# Patient Record
Sex: MALE | Race: WHITE | NOT HISPANIC OR LATINO | Employment: FULL TIME | URBAN - METROPOLITAN AREA
[De-identification: names, ages, dates, MRNs, and addresses within clinical notes are randomized per-mention and may not be internally consistent; named-entity substitution may affect disease eponyms.]

---

## 2017-08-30 ENCOUNTER — HOSPITAL ENCOUNTER (EMERGENCY)
Facility: HOSPITAL | Age: 37
Discharge: HOME | End: 2017-08-31
Attending: EMERGENCY MEDICINE | Admitting: EMERGENCY MEDICINE

## 2017-08-30 DIAGNOSIS — K40.90 RIGHT INGUINAL HERNIA: Primary | ICD-10-CM

## 2017-08-30 LAB
ALBUMIN SERPL-MCNC: 4.6 G/DL (ref 3.2–4.8)
ALBUMIN/GLOB SERPL: 1.7 G/DL (ref 1.5–2.5)
ALP SERPL-CCNC: 54 U/L (ref 25–100)
ALT SERPL W P-5'-P-CCNC: 22 U/L (ref 7–40)
ANION GAP SERPL CALCULATED.3IONS-SCNC: 3 MMOL/L (ref 3–11)
AST SERPL-CCNC: 21 U/L (ref 0–33)
BASOPHILS # BLD AUTO: 0.01 10*3/MM3 (ref 0–0.2)
BASOPHILS NFR BLD AUTO: 0.2 % (ref 0–1)
BILIRUB SERPL-MCNC: 0.4 MG/DL (ref 0.3–1.2)
BILIRUB UR QL STRIP: NEGATIVE
BUN BLD-MCNC: 13 MG/DL (ref 9–23)
BUN/CREAT SERPL: 16.3 (ref 7–25)
CALCIUM SPEC-SCNC: 9.5 MG/DL (ref 8.7–10.4)
CHLORIDE SERPL-SCNC: 107 MMOL/L (ref 99–109)
CLARITY UR: CLEAR
CO2 SERPL-SCNC: 31 MMOL/L (ref 20–31)
COLOR UR: YELLOW
CREAT BLD-MCNC: 0.8 MG/DL (ref 0.6–1.3)
DEPRECATED RDW RBC AUTO: 38.9 FL (ref 37–54)
EOSINOPHIL # BLD AUTO: 0.17 10*3/MM3 (ref 0–0.3)
EOSINOPHIL NFR BLD AUTO: 3 % (ref 0–3)
ERYTHROCYTE [DISTWIDTH] IN BLOOD BY AUTOMATED COUNT: 13.3 % (ref 11.3–14.5)
GFR SERPL CREATININE-BSD FRML MDRD: 109 ML/MIN/1.73
GLOBULIN UR ELPH-MCNC: 2.7 GM/DL
GLUCOSE BLD-MCNC: 112 MG/DL (ref 70–100)
GLUCOSE UR STRIP-MCNC: NEGATIVE MG/DL
HCT VFR BLD AUTO: 40.1 % (ref 38.9–50.9)
HGB BLD-MCNC: 12.9 G/DL (ref 13.1–17.5)
HGB UR QL STRIP.AUTO: NEGATIVE
IMM GRANULOCYTES # BLD: 0.01 10*3/MM3 (ref 0–0.03)
IMM GRANULOCYTES NFR BLD: 0.2 % (ref 0–0.6)
KETONES UR QL STRIP: NEGATIVE
LEUKOCYTE ESTERASE UR QL STRIP.AUTO: NEGATIVE
LIPASE SERPL-CCNC: 38 U/L (ref 6–51)
LYMPHOCYTES # BLD AUTO: 2.34 10*3/MM3 (ref 0.6–4.8)
LYMPHOCYTES NFR BLD AUTO: 41 % (ref 24–44)
MCH RBC QN AUTO: 26 PG (ref 27–31)
MCHC RBC AUTO-ENTMCNC: 32.2 G/DL (ref 32–36)
MCV RBC AUTO: 80.8 FL (ref 80–99)
MONOCYTES # BLD AUTO: 0.39 10*3/MM3 (ref 0–1)
MONOCYTES NFR BLD AUTO: 6.8 % (ref 0–12)
NEUTROPHILS # BLD AUTO: 2.79 10*3/MM3 (ref 1.5–8.3)
NEUTROPHILS NFR BLD AUTO: 48.8 % (ref 41–71)
NITRITE UR QL STRIP: NEGATIVE
PH UR STRIP.AUTO: 7 [PH] (ref 5–8)
PLATELET # BLD AUTO: 198 10*3/MM3 (ref 150–450)
PMV BLD AUTO: 11.1 FL (ref 6–12)
POTASSIUM BLD-SCNC: 3.8 MMOL/L (ref 3.5–5.5)
PROT SERPL-MCNC: 7.3 G/DL (ref 5.7–8.2)
PROT UR QL STRIP: NEGATIVE
RBC # BLD AUTO: 4.96 10*6/MM3 (ref 4.2–5.76)
SODIUM BLD-SCNC: 141 MMOL/L (ref 132–146)
SP GR UR STRIP: 1.01 (ref 1–1.03)
UROBILINOGEN UR QL STRIP: NORMAL
WBC NRBC COR # BLD: 5.71 10*3/MM3 (ref 3.5–10.8)

## 2017-08-30 PROCEDURE — 85025 COMPLETE CBC W/AUTO DIFF WBC: CPT | Performed by: EMERGENCY MEDICINE

## 2017-08-30 PROCEDURE — 99284 EMERGENCY DEPT VISIT MOD MDM: CPT

## 2017-08-30 PROCEDURE — 83690 ASSAY OF LIPASE: CPT | Performed by: EMERGENCY MEDICINE

## 2017-08-30 PROCEDURE — 80053 COMPREHEN METABOLIC PANEL: CPT | Performed by: EMERGENCY MEDICINE

## 2017-08-30 PROCEDURE — 81003 URINALYSIS AUTO W/O SCOPE: CPT | Performed by: EMERGENCY MEDICINE

## 2017-08-30 RX ORDER — SODIUM CHLORIDE 0.9 % (FLUSH) 0.9 %
10 SYRINGE (ML) INJECTION AS NEEDED
Status: DISCONTINUED | OUTPATIENT
Start: 2017-08-30 | End: 2017-08-31 | Stop reason: HOSPADM

## 2017-08-31 ENCOUNTER — APPOINTMENT (OUTPATIENT)
Dept: CT IMAGING | Facility: HOSPITAL | Age: 37
End: 2017-08-31

## 2017-08-31 VITALS
TEMPERATURE: 98.2 F | OXYGEN SATURATION: 100 % | BODY MASS INDEX: 28.7 KG/M2 | SYSTOLIC BLOOD PRESSURE: 140 MMHG | DIASTOLIC BLOOD PRESSURE: 98 MMHG | HEART RATE: 68 BPM | HEIGHT: 71 IN | RESPIRATION RATE: 20 BRPM | WEIGHT: 205 LBS

## 2017-08-31 LAB
HOLD SPECIMEN: NORMAL
HOLD SPECIMEN: NORMAL
WHOLE BLOOD HOLD SPECIMEN: NORMAL
WHOLE BLOOD HOLD SPECIMEN: NORMAL

## 2017-08-31 PROCEDURE — 0 IOPAMIDOL 61 % SOLUTION: Performed by: EMERGENCY MEDICINE

## 2017-08-31 PROCEDURE — 96374 THER/PROPH/DIAG INJ IV PUSH: CPT

## 2017-08-31 PROCEDURE — 96375 TX/PRO/DX INJ NEW DRUG ADDON: CPT

## 2017-08-31 PROCEDURE — 25010000002 HYDROMORPHONE PER 4 MG: Performed by: EMERGENCY MEDICINE

## 2017-08-31 PROCEDURE — 25010000002 KETOROLAC TROMETHAMINE PER 15 MG: Performed by: PHYSICIAN ASSISTANT

## 2017-08-31 PROCEDURE — 96376 TX/PRO/DX INJ SAME DRUG ADON: CPT

## 2017-08-31 PROCEDURE — 0 DIATRIZOATE MEGLUMINE & SODIUM PER 1 ML: Performed by: EMERGENCY MEDICINE

## 2017-08-31 PROCEDURE — 74177 CT ABD & PELVIS W/CONTRAST: CPT

## 2017-08-31 PROCEDURE — 96361 HYDRATE IV INFUSION ADD-ON: CPT

## 2017-08-31 PROCEDURE — 25010000002 ONDANSETRON PER 1 MG: Performed by: EMERGENCY MEDICINE

## 2017-08-31 RX ORDER — KETOROLAC TROMETHAMINE 15 MG/ML
15 INJECTION, SOLUTION INTRAMUSCULAR; INTRAVENOUS ONCE
Status: COMPLETED | OUTPATIENT
Start: 2017-08-31 | End: 2017-08-31

## 2017-08-31 RX ORDER — HYDROMORPHONE HYDROCHLORIDE 1 MG/ML
0.5 INJECTION, SOLUTION INTRAMUSCULAR; INTRAVENOUS; SUBCUTANEOUS ONCE
Status: COMPLETED | OUTPATIENT
Start: 2017-08-31 | End: 2017-08-31

## 2017-08-31 RX ORDER — SODIUM CHLORIDE 9 MG/ML
125 INJECTION, SOLUTION INTRAVENOUS CONTINUOUS
Status: DISCONTINUED | OUTPATIENT
Start: 2017-08-31 | End: 2017-08-31 | Stop reason: HOSPADM

## 2017-08-31 RX ORDER — OXYCODONE AND ACETAMINOPHEN 7.5; 325 MG/1; MG/1
1 TABLET ORAL EVERY 6 HOURS PRN
Qty: 12 TABLET | Refills: 0 | Status: SHIPPED | OUTPATIENT
Start: 2017-08-31 | End: 2021-10-15

## 2017-08-31 RX ORDER — ONDANSETRON 2 MG/ML
4 INJECTION INTRAMUSCULAR; INTRAVENOUS ONCE
Status: COMPLETED | OUTPATIENT
Start: 2017-08-31 | End: 2017-08-31

## 2017-08-31 RX ORDER — ONDANSETRON 4 MG/1
4 TABLET, FILM COATED ORAL EVERY 8 HOURS PRN
Qty: 20 TABLET | Refills: 0 | Status: SHIPPED | OUTPATIENT
Start: 2017-08-31 | End: 2021-10-15

## 2017-08-31 RX ADMIN — IOPAMIDOL 100 ML: 612 INJECTION, SOLUTION INTRAVENOUS at 01:04

## 2017-08-31 RX ADMIN — HYDROMORPHONE HYDROCHLORIDE 0.5 MG: 1 INJECTION, SOLUTION INTRAMUSCULAR; INTRAVENOUS; SUBCUTANEOUS at 03:02

## 2017-08-31 RX ADMIN — DIATRIZOATE MEGLUMINE AND DIATRIZOATE SODIUM 15 ML: 660; 100 LIQUID ORAL; RECTAL at 00:00

## 2017-08-31 RX ADMIN — HYDROMORPHONE HYDROCHLORIDE 1 MG: 1 INJECTION, SOLUTION INTRAMUSCULAR; INTRAVENOUS; SUBCUTANEOUS at 00:57

## 2017-08-31 RX ADMIN — ONDANSETRON 4 MG: 2 INJECTION INTRAMUSCULAR; INTRAVENOUS at 00:56

## 2017-08-31 RX ADMIN — HYDROMORPHONE HYDROCHLORIDE 0.5 MG: 1 INJECTION, SOLUTION INTRAMUSCULAR; INTRAVENOUS; SUBCUTANEOUS at 01:44

## 2017-08-31 RX ADMIN — SODIUM CHLORIDE 125 ML/HR: 9 INJECTION, SOLUTION INTRAVENOUS at 01:27

## 2017-08-31 RX ADMIN — KETOROLAC TROMETHAMINE 15 MG: 15 INJECTION, SOLUTION INTRAMUSCULAR; INTRAVENOUS at 01:48

## 2019-06-12 ENCOUNTER — HOSPITAL ENCOUNTER (OUTPATIENT)
Age: 39
End: 2019-06-12
Payer: MEDICAID

## 2019-06-12 DIAGNOSIS — D64.9: Primary | ICD-10-CM

## 2019-06-12 LAB
FERRITIN SERPL-MCNC: 6 NG/ML (ref 8–388)
HCT VFR BLD CALC: 36.5 % (ref 42–52)
HGB BLD-MCNC: 11 G/DL (ref 14.1–18)
MCHC RBC-ENTMCNC: 30 G/DL (ref 31.8–35.4)
MCV RBC: 70.8 FL (ref 80–94)
MEAN CORPUSCULAR HEMOGLOBIN: 21.3 PG (ref 27–31.2)
PLATELET # BLD: 182 K/MM3 (ref 142–424)
RBC # BLD AUTO: 5.16 M/MM3 (ref 4.6–6.2)
WBC # BLD AUTO: 3.8 K/MM3 (ref 4.8–10.8)

## 2019-06-12 PROCEDURE — 82728 ASSAY OF FERRITIN: CPT

## 2019-06-12 PROCEDURE — 83540 ASSAY OF IRON: CPT

## 2019-06-12 PROCEDURE — 83550 IRON BINDING TEST: CPT

## 2019-06-12 PROCEDURE — 36415 COLL VENOUS BLD VENIPUNCTURE: CPT

## 2019-06-12 PROCEDURE — 82607 VITAMIN B-12: CPT

## 2019-06-12 PROCEDURE — 85025 COMPLETE CBC W/AUTO DIFF WBC: CPT

## 2019-06-13 ENCOUNTER — HOSPITAL ENCOUNTER (OUTPATIENT)
Age: 39
End: 2019-06-13
Payer: MEDICAID

## 2019-06-13 DIAGNOSIS — R10.84: ICD-10-CM

## 2019-06-13 DIAGNOSIS — D64.9: ICD-10-CM

## 2019-06-13 DIAGNOSIS — R19.7: Primary | ICD-10-CM

## 2019-06-13 DIAGNOSIS — K62.5: ICD-10-CM

## 2019-06-13 DIAGNOSIS — K21.9: ICD-10-CM

## 2019-06-13 LAB
IRON: 43 UG/DL (ref 38–169)
UIBC: 442 UG/DL (ref 111–343)

## 2019-06-13 PROCEDURE — 87506 IADNA-DNA/RNA PROBE TQ 6-11: CPT

## 2019-06-14 LAB — VITAMIN B12: 510 PG/ML (ref 232–1245)

## 2019-07-03 ENCOUNTER — HOSPITAL ENCOUNTER (OUTPATIENT)
Age: 39
End: 2019-07-03
Payer: MEDICAID

## 2019-07-03 DIAGNOSIS — K82.9: ICD-10-CM

## 2019-07-03 DIAGNOSIS — R19.7: ICD-10-CM

## 2019-07-03 DIAGNOSIS — K85.90: ICD-10-CM

## 2019-07-03 DIAGNOSIS — D12.6: ICD-10-CM

## 2019-07-03 DIAGNOSIS — R10.11: Primary | ICD-10-CM

## 2019-07-03 LAB
ALBUMIN LEVEL: 4.2 GM/DL (ref 3.4–5)
ALBUMIN/GLOB SERPL: 1.2 {RATIO} (ref 1.1–1.8)
ALP ISO SERPL-ACNC: 68 U/L (ref 46–116)
ALT SERPLBLD-CCNC: 30 U/L (ref 12–78)
AMYLASE SERPL-CCNC: 19 U/L (ref 25–115)
ANION GAP SERPL CALC-SCNC: 10.5 MEQ/L (ref 5–15)
AST SERPL QL: 18 U/L (ref 15–37)
BILIRUBIN,TOTAL: 0.5 MG/DL (ref 0.2–1)
BUN SERPL-MCNC: 19 MG/DL (ref 7–18)
CALCIUM SPEC-MCNC: 8.7 MG/DL (ref 8.5–10.1)
CHLORIDE SPEC-SCNC: 105 MMOL/L (ref 98–107)
CO2 SERPL-SCNC: 29 MMOL/L (ref 21–32)
CREAT BLD-SCNC: 0.88 MG/DL (ref 0.7–1.3)
ESTIMATED GLOMERULAR FILT RATE: 97 ML/MIN (ref 60–?)
GFR (AFRICAN AMERICAN): 117 ML/MIN (ref 60–?)
GLOBULIN SER CALC-MCNC: 3.5 GM/DL (ref 1.3–3.2)
GLUCOSE: 102 MG/DL (ref 74–106)
HCT VFR BLD CALC: 37.1 % (ref 42–52)
HGB BLD-MCNC: 11.4 G/DL (ref 14.1–18)
LIPASE: 92 U/L (ref 73–393)
MCHC RBC-ENTMCNC: 30.7 G/DL (ref 31.8–35.4)
MCV RBC: 72 FL (ref 80–94)
MEAN CORPUSCULAR HEMOGLOBIN: 22.1 PG (ref 27–31.2)
PLATELET # BLD: 237 K/MM3 (ref 142–424)
POTASSIUM: 4.5 MMOL/L (ref 3.5–5.1)
PROT SERPL-MCNC: 7.7 GM/DL (ref 6.4–8.2)
RBC # BLD AUTO: 5.16 M/MM3 (ref 4.6–6.2)
SODIUM SPEC-SCNC: 140 MMOL/L (ref 136–145)
WBC # BLD AUTO: 4.7 K/MM3 (ref 4.8–10.8)

## 2019-07-03 PROCEDURE — 82150 ASSAY OF AMYLASE: CPT

## 2019-07-03 PROCEDURE — 85025 COMPLETE CBC W/AUTO DIFF WBC: CPT

## 2019-07-03 PROCEDURE — 80053 COMPREHEN METABOLIC PANEL: CPT

## 2019-07-03 PROCEDURE — 83690 ASSAY OF LIPASE: CPT

## 2019-07-03 PROCEDURE — 36415 COLL VENOUS BLD VENIPUNCTURE: CPT

## 2019-07-08 ENCOUNTER — TRANSCRIBE ORDERS (OUTPATIENT)
Dept: PHYSICAL THERAPY | Facility: HOSPITAL | Age: 39
End: 2019-07-08

## 2019-07-08 ENCOUNTER — HOSPITAL ENCOUNTER (OUTPATIENT)
Age: 39
End: 2019-07-08
Payer: MEDICAID

## 2019-07-08 DIAGNOSIS — M54.12 CERVICAL RADICULOPATHY AT C7: ICD-10-CM

## 2019-07-08 DIAGNOSIS — K82.9: Primary | ICD-10-CM

## 2019-07-08 DIAGNOSIS — M54.12 CERVICAL RADICULOPATHY AT C6: Primary | ICD-10-CM

## 2019-07-08 PROCEDURE — 76705 ECHO EXAM OF ABDOMEN: CPT

## 2019-07-18 ENCOUNTER — HOSPITAL ENCOUNTER (OUTPATIENT)
Dept: PHYSICAL THERAPY | Facility: HOSPITAL | Age: 39
Setting detail: THERAPIES SERIES
Discharge: HOME | End: 2019-07-18

## 2019-07-18 DIAGNOSIS — M54.2 NECK PAIN: Primary | ICD-10-CM

## 2019-07-18 PROCEDURE — 97161 PT EVAL LOW COMPLEX 20 MIN: CPT

## 2019-07-18 NOTE — THERAPY EVALUATION
Outpatient Physical Therapy Ortho Initial Evaluation  [facility]     Patient Name: Robbi Pace  : 1980  MRN: [id number]  Today's Date: 2019      Visit Date: 2019    There is no problem list on file for this patient.       No past medical history on file.     No past surgical history on file.    Visit Dx:     ICD-10-CM ICD-9-CM   1. Neck pain M54.2 723.1         Patient History     Row Name 19 1400             History    Chief Complaint  Pain;Numbness  -THAI (r) NS (t) THAI (c)      Type of Pain  Upper Extremity / Arm;Hand pain  -THAI (r) NS (t) THAI (c)      Brief Description of Current Complaint  Patient states that he has had neck issues off and on for 12 years. He reports numbness in his left arm and fingers, mainly his digits 1-3. This episode has been going on for about 1 month. He is a  and has noticed increased tingling and  weakness at work. He also feels increased tingling with his head extended. He has 4 children and notes that the tingling sometimes comes on when he is playing with them, although he is not limited in his activities.  -THAI (r) NS (t) THAI (c)      Smoking Status  nonuser  -THAI (r) NS (t) THAI (c)      Hand Dominance  right-handed  -THAI (r) NS (t) THAI (c)      Occupation/sports/leisure activities  spending time with his family, works as a   -THAI (r) NS (t) THAI (c)      Patient seeing anyone else for problem(s)?  has had PT for his neck in the past  -THAI (r) NS (t) THAI (c)         Pain     Pain at Present  0  -THAI (r) NS (t) THAI (c)      Pain at Best  0  -THAI (r) NS (t) THAI (c)      Pain at Worst  0  -THAI (r) NS (t) THAI (c)      Difficulties at work?   weakness and tingling  -THAI (r) NS (t) THAI (c)      Difficulties with ADL's?  No  -THAI (r) NS (t) THAI (c)      Difficulties with recreational activities?  sitting in certain positions  -THAI (r) NS (t) THAI (c)         Fall Risk Assessment    Any falls in the past year:  No  -THAI (r) NS (t) THAI (c)          Daily Activities    Primary Language  English  -THAI (r) NS (t) THAI (c)      Barriers to learning  None  -THAI (r) NS (t) THAI (c)         Safety    Are you being hurt, hit, or frightened by anyone at home or in your life?  No  -THAI (r) NS (t) THAI (c)        User Key  (r) = Recorded By, (t) = Taken By, (c) = Cosigned By    Initials Name Provider Type    THAI Olvin Chaney, PT Physical Therapist    NS Divine Ng, PT Student PT Student          PT Ortho     Row Name 07/18/19 1400       Posture/Observations    Posture/Observations Comments  Forward head posture, rounded shoulders  -THAI (r) NS (t) THAI (c)       DTR- Upper Quarter Clearing    Biceps (C5/6)  Bilateral:;2- Normal response  -THAI (r) NS (t) THAI (c)    Brachioradialis (C6)  Bilateral:;2- Normal response  -THAI (r) NS (t) THAI (c)    Triceps (C7)  Bilateral:;2- Normal response  -THAI (r) NS (t) THAI (c)       Neural Tension Signs- Upper Quarter Clearing    ULNTT 1  Bilateral:;Postive  -THAI (r) NS (t) THAI (c)    ULNTT 3  Left:;Negative  -THAI (r) NS (t) THAI (c)    ULNTT 4  Left:;Negative  -THAI (r) NS (t) THAI (c)       Myotomal Screen- Upper Quarter Clearing    Shoulder flexion (C5)  Left:;4- (Good -);Right:;4+ (Good +)  -THAI (r) NS (t) THAI (c)    Elbow flexion/wrist extension (C6)  Bilateral:;5 (Normal)  -THAI (r) NS (t) THAI (c)    Elbow extension/wrist flexion (C7)  Bilateral:;4+ (Good +)  -THAI (r) NS (t) THAI (c)       Cervical/Shoulder ROM Screen    Cervical flexion  Normal Increased crepitus in lower c-spine  -THAI (r) NS (t) THAI (c)    Cervical extension  Normal  -THAI (r) NS (t) THAI (c)    Cervical quadrant (Spurling's)  Normal  -THAI (r) NS (t) THAI (c)    Shoulder elevation   Normal  -THAI (r) NS (t) THAI (c)       Cervical Palpation    Cervical Palpation- Location?  -- Increase UE sx during palpation of C3-C7 spinous processes  -THAI (r) NS (t) THAI (c)       Cervical/Thoracic Special Tests    Spurlings (Foraminal Compression)  Bilateral:;Negative  -THAI (r) NS (t) THAI (c)    Cervical Distraction  (Foraminal Compression vs. Facet Pain)  Bilateral:;Positive relief of sx following palpation  -THAI (r) NS (t) THAI (c)    Tinel's Sign (TOS)  Left:;Negative  -THAI (r) NS (t) THAI (c)    Phalen's Test (Carpal Tunnel Syndrome)  Left:;Positive  -THAI (r) NS (t) THAI (c)       General ROM    GENERAL ROM COMMENTS  WFL except R rotation: 50 with pain, L rotation: 56  -THAI (r) NS (t) THAI (c)       MMT (Manual Muscle Testing)    General MMT Comments  Shoulder ABD: L 4-/5 with increased pain/tingling in L cervical/trap region, R 4+/5. R scap retraction: 3+/5, L: unable to complete due to pain  -THAI (r) NS (t) THAI (c)        Strength Right    Right  Test 1  105  -THAI (r) NS (t) THAI (c)    Right  Test 2  102  -THAI (r) NS (t) THAI (c)    Right  Test 3  100  -THAI (r) NS (t) THAI (c)     Strength Average Right  102.33  -THAI (r) NS (t)        Strength Left    Left  Test 1  80  -THAI (r) NS (t) THAI (c)    Left  Test 2  75  -THAI (r) NS (t) THAI (c)    Left  Test 3  73  -THAI (r) NS (t) THAI (c)     Strength Average Left  76  -THAI (r) NS (t)       Sensation    Light Touch  Partial deficits in the LUE  -THAI (r) NS (t) THAI (c)       Hand  Strength     Strength Affected Side  Left  -THAI (r) NS (t) THAI (c)      User Key  (r) = Recorded By, (t) = Taken By, (c) = Cosigned By    Initials Name Provider Type    Olvin Najera, PT Physical Therapist    Divine Anderson, PT Student PT Student                      Therapy Education  Education Details: Patient was educated about PT POC and completion of HEP  Given: HEP  Program: New  How Provided: Verbal, Demonstration, Written  Provided to: Patient  Level of Understanding: Verbalized, Demonstrated     PT OP Goals     Row Name 07/18/19 1400          PT Short Term Goals    STG Date to Achieve  08/08/19  -THAI (r) NS (t) THAI (c)     STG 1  Patient will be compliant with initial HEP  -THAI (r) NS (t) THAI (c)     STG 1 Progress  New  -THAI (r) NS (t) THAI (c)     STG 2  Patient will report no  tingling/numbness in arm/hand for more than 1/2 the day  -THAI (r) NS (t) THAI (c)     STG 2 Progress  New  -THAI (r) NS (t) THAI (c)        Long Term Goals    LTG Date to Achieve  08/29/19  -THAI (r) NS (t) THAI (c)     LTG 1  Patient will demonstrate independence with final HEP  -THAI (r) NS (t) THAI (c)     LTG 1 Progress  New  -THAI (r) NS (t) THAI (c)     LTG 2  Patient will demonstrate L UE strength >/= 4+/5 without pain  -THAI (r) NS (t) THAI (c)     LTG 2 Progress  New  -THAI (r) NS (t) THAI (c)     LTG 3  Patient will demonstrate improved L  strength to 85lbs to improve work tasks  -THAI (r) NS (t) THAI (c)     LTG 3 Progress  New  -THAI (r) NS (t) THAI (c)     LTG 4  Patient will demonstrate cervical rotation WFL without pain  -THAI (r) NS (t) THAI (c)     LTG 4 Progress  New  -THAI (r) NS (t) THAI (c)        Time Calculation    PT Goal Re-Cert Due Date  10/16/19  -THAI (r) NS (t) THAI (c)       User Key  (r) = Recorded By, (t) = Taken By, (c) = Cosigned By    Initials Name Provider Type    Olvin Najera, PT Physical Therapist    Divine Anderson, PT Student PT Student          PT Assessment/Plan     Row Name 07/18/19 1400          PT Assessment    Functional Limitations  Limitation in home management;Limitations in community activities;Performance in leisure activities;Performance in work activities  -THAI (r) NS (t) THAI (c)     Impairments  Impaired flexibility;Muscle strength;Pain;Poor body mechanics;Posture;Range of motion;Sensation  -THAI (r) NS (t) THAI (c)     Assessment Comments  Patient is a 39 year old male who presents with numbness and tingling in his left hand and digits 1-3, as well as around his elbow. His symptoms increase with prolonged cervical extension, right rotation, and upon palpation of most of the c-spine. He has strength and ROM deficits both in the cervical spine and L shoulder. His symptoms improve with distraction. He is demonstrating cervical radiculopathy, likely at C6-C7, as well as a potential carpal tunnel  involvement. He is limited at work and at home due to these deficits, therefore, he will benefit from skilled PT to address these deficits and pain to improve his overall function.   -THAI (r) NS (t) THAI (cindy)     Please refer to paper survey for additional self-reported information  Yes  -THAI (r) NS (t) THAI (c)     Rehab Potential  Good  -THAI (r) NS (t) THAI (cindy)     Patient/caregiver participated in establishment of treatment plan and goals  Yes  -THAI (r) NS (t) THAI (c)     Patient would benefit from skilled therapy intervention  Yes  -THAI (r) NS (t) THAI (c)        PT Plan    PT Frequency  2x/week;1x/week  -THAI (r) NS (t) THAI (cindy)     Predicted Duration of Therapy Intervention (Therapy Eval)  10 visits  -THAI (r) NS (t) THAI (cindy)     Planned CPT's?  PT EVAL LOW COMPLEXITY: 82725;PT THER PROC EA 15 MIN: 68985;PT THER ACT EA 15 MIN: 84644;PT MANUAL THERAPY EA 15 MIN: 75782;PT NEUROMUSC RE-EDUCATION EA 15 MIN: 69898;PT ELECTRICAL STIM UNATTEND: ;PT ULTRASOUND EA 15 MIN: 69197;PT TRACTION CERVICAL: 82730;PT HOT/COLD PACK WC NONMCARE: 02573;PT IONTOPHORESIS EA 15 MIN: 75710  -THAI (r) NS (t) THAI (c)     PT Plan Comments  Patient will be seen 1-2x/week with interventions to include posture training, cervical distraction, scapular stabilization, and manual therapy/modalities as indicated to improve function.  -THAI (r) NS (t) THAI (c)       User Key  (r) = Recorded By, (t) = Taken By, (c) = Cosigned By    Initials Name Provider Type    Olvin Najera, PT Physical Therapist    Divine Anderson, PT Student PT Student            Exercises     Row Name 07/18/19 1400             Exercise 1    Exercise Name 1  Scap retractions  -THAI (r) NS (t) THAI (c)      Reps 1  5  -THAI (r) NS (t) THAI (c)      Time 1  3 sec hold  -THAI (r) NS (t) THAI (c)         Exercise 2    Exercise Name 2  Chin retractions  -THAI (r) NS (t) THAI (c)      Reps 2  5  -THAI (r) NS (t) THAI (c)      Time 2  3 sec hold  -THAI (r) NS (t) THAI (c)         Exercise 3    Exercise Name 3  CT1 stretch   -THAI (r) NS (t) THAI (c)      Reps 3  1  -THAI (r) NS (t) THAI (c)      Time 3  20 secs  -THAI (r) NS (t) THAI (c)         Exercise 4    Exercise Name 4  Upper trap stretch  -THAI (r) NS (t) THAI (c)      Reps 4  1  -THAI (r) NS (t) THAI (c)      Time 4  20 sec B  -THAI (r) NS (t) THAI (c)        User Key  (r) = Recorded By, (t) = Taken By, (c) = Cosigned By    Initials Name Provider Type    THAI Olvin Chaney, PT Physical Therapist    NS Divine Ng, PT Student PT Student                                  Time Calculation:     Start Time: 1430     Therapy Charges for Today     Code Description Service Date Service Provider Modifiers Qty    06931620983  PT EVAL LOW COMPLEXITY 4 7/18/2019 Divine Ng, PT Student GP 1                    Divine Ng, PT Student  7/18/2019

## 2019-07-22 ENCOUNTER — HOSPITAL ENCOUNTER (OUTPATIENT)
Dept: PHYSICAL THERAPY | Facility: HOSPITAL | Age: 39
Setting detail: THERAPIES SERIES
Discharge: HOME | End: 2019-07-22

## 2019-07-22 ENCOUNTER — HOSPITAL ENCOUNTER (OUTPATIENT)
Age: 39
End: 2019-07-22
Payer: MEDICAID

## 2019-07-22 DIAGNOSIS — M54.2 NECK PAIN: Primary | ICD-10-CM

## 2019-07-22 DIAGNOSIS — R13.10: Primary | ICD-10-CM

## 2019-07-22 PROCEDURE — 74245: CPT

## 2019-07-22 PROCEDURE — 97110 THERAPEUTIC EXERCISES: CPT

## 2019-07-22 PROCEDURE — 97140 MANUAL THERAPY 1/> REGIONS: CPT

## 2019-07-22 NOTE — THERAPY TREATMENT NOTE
Outpatient Physical Therapy Ortho Treatment Note  [facility]     Patient Name: Robbi Pace  : 1980  MRN: [id number]  Today's Date: 2019      Visit Date: 2019    Visit Dx:    ICD-10-CM ICD-9-CM   1. Neck pain M54.2 723.1       There is no problem list on file for this patient.       No past medical history on file.     No past surgical history on file.    PT Ortho     Row Name 19 1445       Subjective Comments    Subjective Comments  Patient states that he is feeling about the same as last week. He states that he is still having increased numbness and tingling when he sits in certain positions with his head extended and slouched posture but feels better when he sits up straight.  -THAI (r) NS (t) THAI (c)       Subjective Pain    Able to rate subjective pain?  yes  -THAI (r) NS (t) THAI (c)    Pre-Treatment Pain Level  0  -THAI (r) NS (t) THAI (c)    Post-Treatment Pain Level  0  -THAI (r) NS (t) THAI (c)      User Key  (r) = Recorded By, (t) = Taken By, (c) = Cosigned By    Initials Name Provider Type    Olvin Najera, PT Physical Therapist    Divine Anderson, PT Student PT Student                      PT Assessment/Plan     Row Name 19 1445          PT Assessment    Assessment Comments  Patient tolerated therapy well. He continues to have increase in 2nd digit and proximal forearm numbness and tingling when he has a protruded head and poor posture that improves with posture correction and distraction. His symptoms also increase with PAs over the mid cervical region. He is improving in his scapular mobility and will continue to benefit from posture training, scapular strengthening, and manual therapy with dry needling to be considered to address episodes of burning pain and tightness of upper trap muscle.  -THAI (r) NS (t) THAI (c)        PT Plan    PT Plan Comments  add progressive scapular stabilization activities with bands, APs/distraction for pain relief, consider dry needling for  "upper trap symptoms.  -THAI (r) NS (t) THAI (c)       User Key  (r) = Recorded By, (t) = Taken By, (c) = Cosigned By    Initials Name Provider Type    Olvin Najera, PT Physical Therapist    Divine Anderson, PT Student PT Student            Exercises     Row Name 07/22/19 4026             Subjective Comments    Subjective Comments  Patient states that he is feeling about the same as last week. He states that he is still having increased numbness and tingling when he sits in certain positions with his head extended and slouched posture but feels better when he sits up straight.  -THAI (r) NS (t) THAI (c)         Subjective Pain    Able to rate subjective pain?  yes  -THAI (r) NS (t) THAI (c)      Pre-Treatment Pain Level  0  -THAI (r) NS (t) THAI (c)      Post-Treatment Pain Level  0  -THAI (r) NS (t) THAI (c)         Total Minutes    05230 - PT Therapeutic Exercise Minutes  31  -THAI (r) NS (t) THAI (c)      61372 - PT Manual Therapy Minutes  11  -THAI (r) NS (t) THAI (c)         Exercise 1    Exercise Name 1  Scap retractions  -THAI (r) NS (t) THAI (c)      Reps 1  5  -THAI (r) NS (t) THAI (c)      Time 1  3 sec hold  -THAI (r) NS (t) THAI (c)         Exercise 2    Exercise Name 2  Supine chin retractions  -THAI (r) NS (t) THAI (c)      Reps 2  12  -THAI (r) NS (t) THAI (c)      Time 2  3 sec hold  -THAI (r) NS (t) THAI (c)         Exercise 3    Exercise Name 3  Forearm extensor stretch  -THAI (r) NS (t) THAI (c)      Reps 3  2  -THAI (r) NS (t) THAI (c)      Time 3  20 secs  -THAI (r) NS (t) THAI (c)         Exercise 4    Exercise Name 4  CT1 stretch  -THAI (r) NS (t) THAI (c)      Reps 4  2  -THAI (r) NS (t) THAI (c)      Time 4  20 secs  -THAI (r) NS (t) THAI (c)         Exercise 5    Exercise Name 5  Levator stretch  -THAI (r) NS (t) THAI (c)      Reps 5  2  -THAI (r) NS (t) THAI (c)      Time 5  20 sec (B)  -THAI (r) NS (t) THAI (c)         Exercise 6    Exercise Name 6  Red band- \"no money\"  -THAI (r) NS (t) THAI (c)      Sets 6  2  -THAI (r) NS (t) THAI (c)      Reps 6  10  -THAI (r) NS (t) THAI (c) "         Exercise 7    Exercise Name 7  Red band rows  -THAI (r) NS (t) THAI (c)      Sets 7  2  -THAI (r) NS (t) THAI (c)      Reps 7  10  -THAI (r) NS (t) THAI (c)         Exercise 8    Exercise Name 8  Putty squeezes- blue  -THAI (r) NS (t) THAI (c)      Reps 8  25  -THAI (r) NS (t) THAI (c)        User Key  (r) = Recorded By, (t) = Taken By, (c) = Cosigned By    Initials Name Provider Type    Olvin Najera, PT Physical Therapist    Divine Anderson, PT Student PT Student                      Manual Rx (last 36 hours)      Manual Treatments     Row Name 07/22/19 1445             Total Minutes    95422 - PT Manual Therapy Minutes  11  -THAI (r) NS (t) THAI (c)         Manual Rx 1    Manual Rx 1 Location  cervical spine  -THAI (r) NS (t) THAI (c)      Manual Rx 1 Type  PAs/lateral glides for assessment, APs and Distraction for treatment, STM for B upper trap tightness  -THAI (r) NS (t) THAI (c)      Manual Rx 1 Duration  11  -THAI (r) NS (t) THAI (c)        User Key  (r) = Recorded By, (t) = Taken By, (c) = Cosigned By    Initials Name Provider Type    Olvin Najera, PT Physical Therapist    Divine Anderson, PT Student PT Student              Therapy Education  Education Details: Patient was educated about continuation of HEP with focus on improved posture, added levator stretch  Given: HEP  Program: New, Reinforced  How Provided: Verbal, Demonstration  Provided to: Patient  Level of Understanding: Verbalized, Demonstrated              Time Calculation:   Start Time: 1445  Therapy Charges for Today     Code Description Service Date Service Provider Modifiers Qty    02699903691 HC PT THER PROC EA 15 MIN 7/22/2019 Divine Ng, PT Student GP 2    [id number] HC PT MANUAL THERAPY EA 15 MIN 7/22/2019 Divine Ng, PT Student GP 1                    Divine Ng, PT Student  7/22/2019

## 2019-07-24 ENCOUNTER — HOSPITAL ENCOUNTER (OUTPATIENT)
Dept: PHYSICAL THERAPY | Facility: HOSPITAL | Age: 39
Setting detail: THERAPIES SERIES
Discharge: HOME | End: 2019-07-24

## 2019-07-24 DIAGNOSIS — M54.2 NECK PAIN: Primary | ICD-10-CM

## 2019-07-24 PROCEDURE — 97110 THERAPEUTIC EXERCISES: CPT

## 2019-07-24 PROCEDURE — 97140 MANUAL THERAPY 1/> REGIONS: CPT

## 2019-07-24 NOTE — THERAPY TREATMENT NOTE
Outpatient Physical Therapy Ortho Treatment Note  [facility]     Patient Name: Robbi Pace  : 1980  MRN: [id number]  Today's Date: 2019      Visit Date: 2019    Visit Dx:    ICD-10-CM ICD-9-CM   1. Neck pain M54.2 723.1       There is no problem list on file for this patient.       No past medical history on file.     No past surgical history on file.    PT Ortho     Row Name 19 1600       Subjective Comments    Subjective Comments  Patient reports that he is feeling about the same. He states that his tingling in his fingers came on when he was doing overhead work but went away as soon as he did a few scapular retractions.  -THAI (r) NS (t) THAI (c)       Subjective Pain    Able to rate subjective pain?  yes  -THAI (r) NS (t) THAI (c)    Pre-Treatment Pain Level  0  -THAI (r) NS (t) THAI (c)    Post-Treatment Pain Level  0  -THAI (r) NS (t) THAI (c)    Row Name 19 1445       Subjective Comments    Subjective Comments  Patient states that he is feeling about the same as last week. He states that he is still having increased numbness and tingling when he sits in certain positions with his head extended and slouched posture but feels better when he sits up straight.  -THAI (r) NS (t) THAI (c)       Subjective Pain    Able to rate subjective pain?  yes  -THAI (r) NS (t) THAI (c)    Pre-Treatment Pain Level  0  -THAI (r) NS (t) THAI (c)    Post-Treatment Pain Level  0  -THAI (r) NS (t) THAI (c)      User Key  (r) = Recorded By, (t) = Taken By, (c) = Cosigned By    Initials Name Provider Type    Olvin Najera, PT Physical Therapist    Olvin Najera, PT Physical Therapist    Divine Anderson, PT Student PT Student                      PT Assessment/Plan     Row Name 19 1600          PT Assessment    Assessment Comments  Patient tolerated therapy well. He continues to have increased tingling/numbness when in a slouched position and when his shoulders are inwardly rotated. He had an increase in  symptoms with APs but decrease in sx with distraction. He also has decreased sx when he performs scapular retractions. He will likely benefit from continuation of cervical traction and scapular stability training.  -THAI (r) NS (t) THAI (c)        PT Plan    PT Plan Comments  add posterior capsule stretching and mechanical traction, progress scapular stabilization  -THAI (r) NS (t) THAI (c)       User Key  (r) = Recorded By, (t) = Taken By, (c) = Cosigned By    Initials Name Provider Type    Olvin Najera, PT Physical Therapist    Divine Anderson, PT Student PT Student            Exercises     Row Name 07/24/19 1600             Subjective Comments    Subjective Comments  Patient reports that he is feeling about the same. He states that his tingling in his fingers came on when he was doing overhead work but went away as soon as he did a few scapular retractions.  -THAI (r) NS (t) THAI (c)         Subjective Pain    Able to rate subjective pain?  yes  -THAI (r) NS (t) THAI (c)      Pre-Treatment Pain Level  0  -THAI (r) NS (t) THAI (c)      Post-Treatment Pain Level  0  -THAI (r) NS (t) THAI (c)         Total Minutes    19871 - PT Therapeutic Exercise Minutes  22  -THAI (r) NS (t) THAI (c)      98633 - PT Manual Therapy Minutes  22  -THAI (r) NS (t) THAI (c)         Exercise 1    Exercise Name 1  Chin retractions with cervical flexion  -THAI (r) NS (t) THAI (c)      Reps 1  10  -THAI (r) NS (t) THAI (c)      Time 1  3 sec hold  -THAI (r) NS (t) THAI (c)         Exercise 2    Exercise Name 2  Wall angels  -THAI (r) NS (t) THAI (c)      Reps 2  10  -THAI (r) NS (t) THAI (c)         Exercise 3    Exercise Name 3  Overhead ball circles  -THAI (r) NS (t) THAI (c)      Reps 3  20 ea (B)  -THAI (r) NS (t) THAI (c)         Exercise 4    Exercise Name 4  Doorway stretch  -THAI (r) NS (t) THAI (c)      Reps 4  2  -THAI (r) NS (t) THAI (c)      Time 4  20 secs  -THAI (r) NS (t) THAI (c)         Exercise 5    Exercise Name 5  Rows- red band  -THAI (r) NS (t) THAI (c)      Time 5  10  -THAI (r) NS  (t) THAI (c)         Exercise 6    Exercise Name 6  Lat pulls- red band  -THAI (r) NS (t) THAI (c)      Reps 6  10  -THAI (r) NS (t) THAI (c)        User Key  (r) = Recorded By, (t) = Taken By, (c) = Cosigned By    Initials Name Provider Type    Olvin Najera, PT Physical Therapist    NS Divine Ng, PT Student PT Student                      Manual Rx (last 36 hours)      Manual Treatments     Row Name 07/24/19 1600             Total Minutes    65961 - PT Manual Therapy Minutes  22  -THAI (r) NS (t) THAI (c)         Manual Rx 1    Manual Rx 1 Location  cervical spine  -THAI (r) NS (t) THAI (c)      Manual Rx 1 Type  PAs and Distraction for treatment, attempted APs with increase in finger sx, STM for B upper trap tightness  -THAI (r) NS (t) THAI (c)         Manual Rx 2    Manual Rx 2 Location  left upper trap  -THAI      Manual Rx 2 Type  DN  -THAI      Manual Rx 2 Grade  obtained informed consent, no adverse signs noted with treatment  -THAI      Manual Rx 2 Duration  LTR x 2   -THAI        User Key  (r) = Recorded By, (t) = Taken By, (c) = Cosigned By    Initials Name Provider Type    Olvin Najera, PT Physical Therapist    Divine Anderson, PT Student PT Student              Therapy Education  Education Details: Patient was educated about continuation of HEP  Program: Reinforced  How Provided: Verbal, Demonstration  Provided to: Patient  Level of Understanding: Verbalized, Demonstrated              Time Calculation:   Start Time: 1610  Therapy Charges for Today     Code Description Service Date Service Provider Modifiers Qty    17664406086  PT THER PROC EA 15 MIN 7/24/2019 Divine Ng, PT Student GP 2    [id number]  PT MANUAL THERAPY EA 15 MIN 7/24/2019 Divine Ng, PT Student GP 1                    Divine Ng PT Student  7/24/2019

## 2019-07-29 ENCOUNTER — HOSPITAL ENCOUNTER (OUTPATIENT)
Dept: PHYSICAL THERAPY | Facility: HOSPITAL | Age: 39
Setting detail: THERAPIES SERIES
Discharge: HOME | End: 2019-07-29

## 2019-07-29 DIAGNOSIS — M54.2 NECK PAIN: Primary | ICD-10-CM

## 2019-07-29 PROCEDURE — 97012 MECHANICAL TRACTION THERAPY: CPT

## 2019-07-29 PROCEDURE — 97110 THERAPEUTIC EXERCISES: CPT

## 2019-07-29 NOTE — THERAPY TREATMENT NOTE
Outpatient Physical Therapy Ortho Treatment Note  [facility]     Patient Name: Robbi Pace  : 1980  MRN: [id number]  Today's Date: 2019      Visit Date: 2019    Visit Dx:    ICD-10-CM ICD-9-CM   1. Neck pain M54.2 723.1       There is no problem list on file for this patient.       No past medical history on file.     No past surgical history on file.    PT Ortho     Row Name 19 1700       Subjective Comments    Subjective Comments  Patient states that he is feeling good today. He states that he has had less numbness in his hand and arm the past few days. He states that he slept wrong on his neck this past weekend and it felt stiff but he feels better now.  -THAI (r) NS (t) THAI (c)       Subjective Pain    Able to rate subjective pain?  yes  -THAI (r) NS (t) THAI (c)    Pre-Treatment Pain Level  0  -THAI (r) NS (t) THAI (c)    Post-Treatment Pain Level  0  -THAI (r) NS (t) THAI (c)      User Key  (r) = Recorded By, (t) = Taken By, (c) = Cosigned By    Initials Name Provider Type    Olvin Najera, PT Physical Therapist    Divine Anderson, PT Student PT Student                      PT Assessment/Plan     Row Name 19 0242          PT Assessment    Assessment Comments  Patient tolerated treatment well. He did not have an increase in numbness in the hand or arm during exercises and completed activities with proper form. He continues to have mildly limited scapular mobility with rowing activities, which improves with practice. He will continue to benefit from scapular stabilization, cervical muscle strengthening, and distraction for pain/numbness relief.   -THAI (r) NS (t) THAI (c)        PT Plan    PT Plan Comments  add weight to Is, Ts, Ys, continue with mechanical traction.  -THAI (r) NS (t) THAI (c)       User Key  (r) = Recorded By, (t) = Taken By, (c) = Cosigned By    Initials Name Provider Type    Olvin Najera, PT Physical Therapist    Divine Anderson, PT Student PT Student           Modalities     Row Name 07/29/19 1700             Traction 96340    Traction Type  Cervical  -THAI (r) NS (t) THAI (c)      Rx Minutes  12  -THAI (r) NS (t) THAI (c)      Position  Supine  -THAI (r) NS (t) THAI (c)      Weight  25  -THAI (r) NS (t) THAI (c)      Hold  30  -THAI (r) NS (t) THAI (c)      Relax  10  -THAI (r) NS (t) THAI (c)      Progression  2  -THAI (r) NS (t) THAI (c)      Regression  0  -THAI (r) NS (t) THAI (c)        User Key  (r) = Recorded By, (t) = Taken By, (c) = Cosigned By    Initials Name Provider Type    THAI Olvin Chaney, PT Physical Therapist    NS Divine Ng, PT Student PT Student        Exercises     Row Name 07/29/19 1700             Subjective Comments    Subjective Comments  Patient states that he is feeling good today. He states that he has had less numbness in his hand and arm the past few days. He states that he slept wrong on his neck this past weekend and it felt stiff but he feels better now.  -THAI (r) NS (t) THAI (c)         Subjective Pain    Able to rate subjective pain?  yes  -THAI (r) NS (t) THAI (c)      Pre-Treatment Pain Level  0  -THAI (r) NS (t) THAI (c)      Post-Treatment Pain Level  0  -THAI (r) NS (t) THAI (c)         Total Minutes    38191 - PT Therapeutic Exercise Minutes  26  -THAI (r) NS (t) THAI (c)      01132 - PT Manual Therapy Minutes  7  -THAI (r) NS (t) THAI (c)         Exercise 1    Exercise Name 1  Ball push ups- low, mid, high  -THAI (r) NS (t) THAI (c)      Reps 1  10 ea  -THAI (r) NS (t) THAI (c)         Exercise 2    Exercise Name 2  Rows- green band  -THAI (r) NS (t) THAI (c)      Sets 2  2  -THAI (r) NS (t) THAI (c)      Reps 2  10  -THAI (r) NS (t) THAI (c)         Exercise 3    Exercise Name 3  Is, Ts, Ys  -THAI (r) NS (t) THAI (c)      Reps 3  10 ea  -THAI (r) NS (t) THAI (c)      Additional Comments  towel under forehead to prevent cervical extension  -THAI (r) NS (t) THAI (c)        User Key  (r) = Recorded By, (t) = Taken By, (c) = Cosigned By    Initials Name Provider Type    Olvin Najera, PT Physical  Therapist    Divine Anderson, PT Student PT Student                      Manual Rx (last 36 hours)      Manual Treatments     Row Name 07/29/19 1700             Total Minutes    87730 - PT Manual Therapy Minutes  7  -THAI (r) NS (t) THAI (c)         Manual Rx 1    Manual Rx 1 Location  cervical spine  -THAI (r) NS (t) THAI (c)      Manual Rx 1 Type  PAs, lateral glides, STM of B upper trap  -THAI (r) NS (t) THAI (c)      Manual Rx 1 Duration  7  -THAI (r) NS (t) THAI (c)         Manual Rx 2    Manual Rx 2 Location  --  -THAI (r) NS (t) THAI (c)      Manual Rx 2 Type  --  -THAI (r) NS (t) THAI (c)      Manual Rx 2 Duration  --  -THAI (r) NS (t) THAI (c)        User Key  (r) = Recorded By, (t) = Taken By, (c) = Cosigned By    Initials Name Provider Type    Olvin Najera, PT Physical Therapist    Divine Anderson, PT Student PT Student              Therapy Education  Education Details: Patient was educated about continuation of HEP with addition of green band rows.  Given: HEP  Program: New, Reinforced  How Provided: Verbal, Demonstration, Written  Provided to: Patient  Level of Understanding: Verbalized, Demonstrated              Time Calculation:   Start Time: 1615  Therapy Charges for Today     Code Description Service Date Service Provider Modifiers Qty    [id number]  PT THER PROC EA 15 MIN 7/29/2019 Divine Ng, PT Student GP 2    [id number]  PT-TRACTION MECHANICAL 7/29/2019 Divine Ng, PT Student  1                    Divine Ng PT Student  7/29/2019

## 2019-08-12 ENCOUNTER — HOSPITAL ENCOUNTER (OUTPATIENT)
Age: 39
End: 2019-08-12
Payer: MEDICAID

## 2019-08-12 ENCOUNTER — OFFICE (OUTPATIENT)
Dept: RURAL CLINIC 2 | Facility: CLINIC | Age: 39
End: 2019-08-12

## 2019-08-12 VITALS — HEIGHT: 71 IN | DIASTOLIC BLOOD PRESSURE: 81 MMHG | WEIGHT: 228 LBS | SYSTOLIC BLOOD PRESSURE: 132 MMHG

## 2019-08-12 DIAGNOSIS — R19.7: ICD-10-CM

## 2019-08-12 DIAGNOSIS — R19.4 CHANGE IN BOWEL HABIT: ICD-10-CM

## 2019-08-12 DIAGNOSIS — D64.9 ANEMIA, UNSPECIFIED: ICD-10-CM

## 2019-08-12 DIAGNOSIS — R19.7 DIARRHEA, UNSPECIFIED: ICD-10-CM

## 2019-08-12 DIAGNOSIS — R10.84 GENERALIZED ABDOMINAL PAIN: ICD-10-CM

## 2019-08-12 DIAGNOSIS — D64.9: ICD-10-CM

## 2019-08-12 DIAGNOSIS — R10.84: Primary | ICD-10-CM

## 2019-08-12 LAB
ALBUMIN LEVEL: 4.3 GM/DL (ref 3.4–5)
ALBUMIN/GLOB SERPL: 1.4 {RATIO} (ref 1.1–1.8)
ALP ISO SERPL-ACNC: 63 U/L (ref 46–116)
ALT SERPLBLD-CCNC: 21 U/L (ref 12–78)
ANION GAP SERPL CALC-SCNC: 12.3 MEQ/L (ref 5–15)
AST SERPL QL: 11 U/L (ref 15–37)
BILIRUBIN,TOTAL: 0.3 MG/DL (ref 0.2–1)
BUN SERPL-MCNC: 11 MG/DL (ref 7–18)
CALCIUM SPEC-MCNC: 8.9 MG/DL (ref 8.5–10.1)
CHLORIDE SPEC-SCNC: 106 MMOL/L (ref 98–107)
CO2 SERPL-SCNC: 29 MMOL/L (ref 21–32)
CREAT BLD-SCNC: 0.88 MG/DL (ref 0.7–1.3)
ESTIMATED GLOMERULAR FILT RATE: 97 ML/MIN (ref 60–?)
FERRITIN SERPL-MCNC: 5 NG/ML (ref 8–388)
GFR (AFRICAN AMERICAN): 117 ML/MIN (ref 60–?)
GLOBULIN SER CALC-MCNC: 3.1 GM/DL (ref 1.3–3.2)
GLUCOSE: 85 MG/DL (ref 74–106)
HCT VFR BLD CALC: 36.8 % (ref 42–52)
HGB BLD-MCNC: 11.3 G/DL (ref 14.1–18)
MCHC RBC-ENTMCNC: 30.7 G/DL (ref 31.8–35.4)
MCV RBC: 71.7 FL (ref 80–94)
MEAN CORPUSCULAR HEMOGLOBIN: 22 PG (ref 27–31.2)
PLATELET # BLD: 227 K/MM3 (ref 142–424)
POTASSIUM: 4.3 MMOL/L (ref 3.5–5.1)
PROT SERPL-MCNC: 7.4 GM/DL (ref 6.4–8.2)
RBC # BLD AUTO: 5.13 M/MM3 (ref 4.6–6.2)
SODIUM SPEC-SCNC: 143 MMOL/L (ref 136–145)
WBC # BLD AUTO: 4.6 K/MM3 (ref 4.8–10.8)

## 2019-08-12 PROCEDURE — 82607 VITAMIN B-12: CPT

## 2019-08-12 PROCEDURE — 85025 COMPLETE CBC W/AUTO DIFF WBC: CPT

## 2019-08-12 PROCEDURE — 83540 ASSAY OF IRON: CPT

## 2019-08-12 PROCEDURE — 82728 ASSAY OF FERRITIN: CPT

## 2019-08-12 PROCEDURE — 80053 COMPREHEN METABOLIC PANEL: CPT

## 2019-08-12 PROCEDURE — 83550 IRON BINDING TEST: CPT

## 2019-08-12 PROCEDURE — 36415 COLL VENOUS BLD VENIPUNCTURE: CPT

## 2019-08-12 PROCEDURE — 99202 OFFICE O/P NEW SF 15 MIN: CPT | Performed by: NURSE PRACTITIONER

## 2019-08-12 RX ORDER — RIFAXIMIN 550 MG/1
1100 TABLET ORAL
Qty: 28 | Refills: 0 | Status: ACTIVE
Start: 2019-08-12

## 2019-08-12 RX ORDER — DICYCLOMINE HYDROCHLORIDE 10 MG/1
40 CAPSULE ORAL
Qty: 60 | Refills: 3 | Status: ACTIVE
Start: 2019-08-12

## 2019-08-14 LAB
IRON: 22 UG/DL (ref 38–169)
UIBC: 447 UG/DL (ref 111–343)
VITAMIN B12: 425 PG/ML (ref 232–1245)

## 2019-08-29 ENCOUNTER — DOCUMENTATION (OUTPATIENT)
Dept: PHYSICAL THERAPY | Facility: HOSPITAL | Age: 39
End: 2019-08-29

## 2019-08-29 DIAGNOSIS — M54.2 NECK PAIN: Primary | ICD-10-CM

## 2019-08-29 NOTE — THERAPY DISCHARGE NOTE
Outpatient Physical Therapy Discharge Summary         Patient Name: Robbi Pace  : 1980  MRN: [id number]    Today's Date: 2019    Visit Dx:    ICD-10-CM ICD-9-CM   1. Neck pain M54.2 723.1       PT OP Goals     Row Name 19 0700          PT Short Term Goals    STG Date to Achieve  19  -THAI     STG 1  Patient will be compliant with initial HEP  -THAI     STG 1 Progress  Met  -THAI     STG 2  Patient will report no tingling/numbness in arm/hand for more than 1/2 the day  -THAI     STG 2 Progress  Met  -THAI        Long Term Goals    LTG Date to Achieve  19  -THAI     LTG 1  Patient will demonstrate independence with final HEP  -THAI     LTG 1 Progress  Met  -THAI     LTG 2  Patient will demonstrate L UE strength >/= 4+/5 without pain  -THAI     LTG 2 Progress  Ongoing  -THAI     LTG 3  Patient will demonstrate improved L  strength to 85lbs to improve work tasks  -THAI     LTG 3 Progress  Ongoing  -THAI     LTG 4  Patient will demonstrate cervical rotation WFL without pain  -THAI     LTG 4 Progress  Ongoing  -THAI       User Key  (r) = Recorded By, (t) = Taken By, (c) = Cosigned By    Initials Name Provider Type    Olvin Najera, PT Physical Therapist          OP PT Discharge Summary  Date of Discharge: 19  Reason for Discharge: Maximum functional potential achieved, Independent, Non-compliant  Outcomes Achieved: Patient able to partially acheive established goals, Refer to plan of care for updates on goals achieved  Discharge Destination: Home with home program, Unknown  Discharge Instructions/Additional Comments: Patient was making really good progress towards his goals but stopped attending PT after 4 visits without explanation.      Time Calculation:                    Olvin Chaney, PT  2019

## 2020-01-30 ENCOUNTER — TRANSCRIBE ORDERS (OUTPATIENT)
Dept: ADMINISTRATIVE | Facility: HOSPITAL | Age: 40
End: 2020-01-30

## 2020-01-30 DIAGNOSIS — N50.89 TESTICULAR LUMP: Primary | ICD-10-CM

## 2020-01-31 ENCOUNTER — HOSPITAL ENCOUNTER (OUTPATIENT)
Dept: ULTRASOUND IMAGING | Facility: HOSPITAL | Age: 40
Discharge: HOME | End: 2020-01-31
Admitting: NURSE PRACTITIONER

## 2020-01-31 DIAGNOSIS — N50.89 TESTICULAR LUMP: ICD-10-CM

## 2020-01-31 PROCEDURE — 76870 US EXAM SCROTUM: CPT

## 2021-08-06 ENCOUNTER — TRANSCRIBE ORDERS (OUTPATIENT)
Dept: ADMINISTRATIVE | Facility: HOSPITAL | Age: 41
End: 2021-08-06

## 2021-08-06 DIAGNOSIS — M54.12 RADICULOPATHY OF CERVICAL SPINE: Primary | ICD-10-CM

## 2021-08-06 DIAGNOSIS — S09.90XS HEAD INJURY, SEQUELA: ICD-10-CM

## 2021-09-07 ENCOUNTER — APPOINTMENT (OUTPATIENT)
Dept: MRI IMAGING | Facility: HOSPITAL | Age: 41
End: 2021-09-07

## 2021-09-10 ENCOUNTER — HOSPITAL ENCOUNTER (OUTPATIENT)
Dept: MRI IMAGING | Facility: HOSPITAL | Age: 41
Discharge: HOME | End: 2021-09-10

## 2021-09-10 DIAGNOSIS — M54.12 RADICULOPATHY OF CERVICAL SPINE: ICD-10-CM

## 2021-09-10 DIAGNOSIS — S09.90XS HEAD INJURY, SEQUELA: ICD-10-CM

## 2021-09-10 PROCEDURE — 72141 MRI NECK SPINE W/O DYE: CPT

## 2021-09-10 PROCEDURE — 70551 MRI BRAIN STEM W/O DYE: CPT

## 2021-10-15 ENCOUNTER — OFFICE VISIT (OUTPATIENT)
Dept: NEUROSURGERY | Facility: CLINIC | Age: 41
End: 2021-10-15

## 2021-10-15 VITALS — WEIGHT: 223 LBS | HEIGHT: 71 IN | BODY MASS INDEX: 31.22 KG/M2 | TEMPERATURE: 97.3 F

## 2021-10-15 DIAGNOSIS — S13.4XXA WHIPLASH INJURY TO NECK, INITIAL ENCOUNTER: Primary | ICD-10-CM

## 2021-10-15 DIAGNOSIS — M50.30 DDD (DEGENERATIVE DISC DISEASE), CERVICAL: ICD-10-CM

## 2021-10-15 PROCEDURE — 99203 OFFICE O/P NEW LOW 30 MIN: CPT | Performed by: NEUROLOGICAL SURGERY

## 2021-10-15 RX ORDER — MULTIPLE VITAMINS W/ MINERALS TAB 9MG-400MCG
1 TAB ORAL DAILY
COMMUNITY

## 2021-10-15 RX ORDER — ASCORBIC ACID 500 MG
500 TABLET ORAL DAILY
COMMUNITY

## 2021-10-15 RX ORDER — CARISOPRODOL 350 MG/1
350 TABLET ORAL AS NEEDED
COMMUNITY

## 2021-10-15 RX ORDER — DICLOFENAC SODIUM 75 MG/1
75 TABLET, DELAYED RELEASE ORAL AS NEEDED
COMMUNITY
Start: 2021-07-27
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
## 2023-02-06 ENCOUNTER — HOSPITAL ENCOUNTER (OUTPATIENT)
Age: 43
End: 2023-02-06
Payer: COMMERCIAL

## 2023-02-06 DIAGNOSIS — G89.29: ICD-10-CM

## 2023-02-06 DIAGNOSIS — M54.2: Primary | ICD-10-CM

## 2023-02-06 PROCEDURE — 72040 X-RAY EXAM NECK SPINE 2-3 VW: CPT

## 2023-02-09 ENCOUNTER — HOSPITAL ENCOUNTER (OUTPATIENT)
Age: 43
End: 2023-02-09
Payer: COMMERCIAL

## 2023-02-09 DIAGNOSIS — M54.2: Primary | ICD-10-CM

## 2023-02-09 DIAGNOSIS — G89.29: ICD-10-CM

## 2023-02-09 PROCEDURE — 72141 MRI NECK SPINE W/O DYE: CPT

## 2023-02-09 PROCEDURE — 76376 3D RENDER W/INTRP POSTPROCES: CPT

## 2023-02-11 ENCOUNTER — HOSPITAL ENCOUNTER (OUTPATIENT)
Age: 43
End: 2023-02-11
Payer: COMMERCIAL

## 2023-02-11 DIAGNOSIS — Z13.1: ICD-10-CM

## 2023-02-11 DIAGNOSIS — M54.2: ICD-10-CM

## 2023-02-11 DIAGNOSIS — K64.9: ICD-10-CM

## 2023-02-11 DIAGNOSIS — Z76.89: ICD-10-CM

## 2023-02-11 DIAGNOSIS — G89.29: Primary | ICD-10-CM

## 2023-02-11 LAB
ALBUMIN LEVEL: 4.7 G/DL (ref 3.5–5)
ALBUMIN/GLOB SERPL: 2 {RATIO} (ref 1.1–1.8)
ALP ISO SERPL-ACNC: 44 U/L (ref 38–126)
ALT SERPLBLD-CCNC: 26 U/L (ref 12–78)
ANION GAP SERPL CALC-SCNC: 3.8 MEQ/L (ref 5–15)
AST SERPL QL: 30 U/L (ref 17–59)
BILIRUBIN,TOTAL: 0.4 MG/DL (ref 0.2–1.3)
BUN SERPL-MCNC: 14 MG/DL (ref 9–20)
CALCIUM SPEC-MCNC: 8.8 MG/DL (ref 8.4–10.2)
CHLORIDE SPEC-SCNC: 106 MMOL/L (ref 98–107)
CHOLEST SPEC-SCNC: 137 MG/DL (ref 140–200)
CO2 SERPL-SCNC: 28 MMOL/L (ref 22–30)
COLOR UR: YELLOW
CREAT BLD-SCNC: 0.8 MG/DL (ref 0.66–1.25)
CRP SERPL HS-MCNC: 0.6 MG/L (ref 0–4)
ESTIMATED GLOMERULAR FILT RATE: 106 ML/MIN (ref 60–?)
GFR (AFRICAN AMERICAN): 128 ML/MIN (ref 60–?)
GLOBULIN SER CALC-MCNC: 2.4 G/DL (ref 1.3–3.2)
GLUCOSE: 94 MG/DL (ref 74–100)
HBA1C MFR BLD: 5 % (ref 4–6)
HCT VFR BLD CALC: 37 % (ref 42–52)
HDLC SERPL-MCNC: 36 MG/DL (ref 40–60)
HGB BLD-MCNC: 11.8 G/DL (ref 14.1–18)
MCHC RBC-ENTMCNC: 32 G/DL (ref 31.8–35.4)
MCV RBC: 77.8 FL (ref 80–94)
MEAN CORPUSCULAR HEMOGLOBIN: 24.9 PG (ref 27–31.2)
MICRO URNS: (no result)
PH UR: 6.5 [PH] (ref 5–8.5)
PLATELET # BLD: 251 K/MM3 (ref 142–424)
POTASSIUM: 4.8 MMOL/L (ref 3.5–5.1)
PROT SERPL-MCNC: 7.1 G/DL (ref 6.3–8.2)
RBC # BLD AUTO: 4.75 M/MM3 (ref 4.6–6.2)
SODIUM SPEC-SCNC: 133 MMOL/L (ref 136–145)
SP GR UR: <= 1.005 (ref 1–1.03)
TRIGLYCERIDES: 91 MG/DL (ref 30–150)
TSH SERPL-ACNC: 0.77 UIU/ML (ref 0.47–4.68)
UROBILINOGEN UR QL: 0.2 EU/DL
WBC # BLD AUTO: 3.8 K/MM3 (ref 4.8–10.8)

## 2023-02-11 PROCEDURE — 81001 URINALYSIS AUTO W/SCOPE: CPT

## 2023-02-11 PROCEDURE — 83540 ASSAY OF IRON: CPT

## 2023-02-11 PROCEDURE — 36415 COLL VENOUS BLD VENIPUNCTURE: CPT

## 2023-02-11 PROCEDURE — 80053 COMPREHEN METABOLIC PANEL: CPT

## 2023-02-11 PROCEDURE — 82728 ASSAY OF FERRITIN: CPT

## 2023-02-11 PROCEDURE — 83550 IRON BINDING TEST: CPT

## 2023-02-11 PROCEDURE — 83036 HEMOGLOBIN GLYCOSYLATED A1C: CPT

## 2023-02-11 PROCEDURE — 80061 LIPID PANEL: CPT

## 2023-02-11 PROCEDURE — 86140 C-REACTIVE PROTEIN: CPT

## 2023-02-11 PROCEDURE — 85025 COMPLETE CBC W/AUTO DIFF WBC: CPT

## 2023-02-11 PROCEDURE — 84443 ASSAY THYROID STIM HORMONE: CPT

## 2023-02-11 PROCEDURE — 85651 RBC SED RATE NONAUTOMATED: CPT

## 2023-02-13 LAB
FERRITIN SERPL-MCNC: 5.24 NG/ML (ref 17.9–464)
IRON SERPL QL: 27 UG/DL (ref 49–181)
TOTAL IRON BINDING CAPACITY: 485 UG/DL (ref 261–462)

## 2023-02-21 ENCOUNTER — HOSPITAL ENCOUNTER (OUTPATIENT)
Age: 43
Discharge: HOME | End: 2023-02-21
Payer: COMMERCIAL

## 2023-02-21 VITALS
DIASTOLIC BLOOD PRESSURE: 85 MMHG | OXYGEN SATURATION: 100 % | HEART RATE: 81 BPM | RESPIRATION RATE: 18 BRPM | SYSTOLIC BLOOD PRESSURE: 148 MMHG | TEMPERATURE: 97.8 F

## 2023-02-21 VITALS
SYSTOLIC BLOOD PRESSURE: 124 MMHG | OXYGEN SATURATION: 100 % | DIASTOLIC BLOOD PRESSURE: 80 MMHG | RESPIRATION RATE: 17 BRPM | HEART RATE: 80 BPM

## 2023-02-21 VITALS
OXYGEN SATURATION: 97 % | DIASTOLIC BLOOD PRESSURE: 7 MMHG | RESPIRATION RATE: 16 BRPM | SYSTOLIC BLOOD PRESSURE: 125 MMHG | HEART RATE: 76 BPM

## 2023-02-21 VITALS
SYSTOLIC BLOOD PRESSURE: 117 MMHG | OXYGEN SATURATION: 96 % | HEART RATE: 81 BPM | DIASTOLIC BLOOD PRESSURE: 80 MMHG | RESPIRATION RATE: 16 BRPM

## 2023-02-21 VITALS
OXYGEN SATURATION: 97 % | HEART RATE: 79 BPM | TEMPERATURE: 97.16 F | RESPIRATION RATE: 15 BRPM | SYSTOLIC BLOOD PRESSURE: 123 MMHG | DIASTOLIC BLOOD PRESSURE: 71 MMHG

## 2023-02-21 VITALS — OXYGEN SATURATION: 100 %

## 2023-02-21 VITALS — BODY MASS INDEX: 31.6 KG/M2

## 2023-02-21 DIAGNOSIS — Z79.899: ICD-10-CM

## 2023-02-21 DIAGNOSIS — Z86.010: ICD-10-CM

## 2023-02-21 DIAGNOSIS — Z12.11: Primary | ICD-10-CM

## 2023-02-21 DIAGNOSIS — K64.9: ICD-10-CM

## 2023-02-21 PROCEDURE — 45378 DIAGNOSTIC COLONOSCOPY: CPT

## 2023-02-21 PROCEDURE — 0DJD8ZZ INSPECTION OF LOWER INTESTINAL TRACT, VIA NATURAL OR ARTIFICIAL OPENING ENDOSCOPIC: ICD-10-PCS | Performed by: SURGERY

## 2023-03-10 ENCOUNTER — HOSPITAL ENCOUNTER (OUTPATIENT)
Age: 43
End: 2023-03-10
Payer: COMMERCIAL

## 2023-03-10 DIAGNOSIS — K64.8: Primary | ICD-10-CM

## 2023-03-10 PROCEDURE — 74177 CT ABD & PELVIS W/CONTRAST: CPT

## 2024-01-03 ENCOUNTER — HOSPITAL ENCOUNTER (OUTPATIENT)
Dept: HOSPITAL 22 - LAB.DROPOF | Age: 44
End: 2024-01-03
Payer: COMMERCIAL

## 2024-01-03 DIAGNOSIS — R07.89: Primary | ICD-10-CM

## 2024-01-03 LAB — TROPONIN I: < 0.01 NG/ML (ref 0–0.03)

## 2024-01-03 PROCEDURE — 84484 ASSAY OF TROPONIN QUANT: CPT

## 2024-07-26 ENCOUNTER — HOSPITAL ENCOUNTER (OUTPATIENT)
Dept: HOSPITAL 22 - LAB.DROPOF | Age: 44
Discharge: HOME | End: 2024-07-26
Payer: COMMERCIAL

## 2024-07-26 ENCOUNTER — HOSPITAL ENCOUNTER (OUTPATIENT)
Dept: HOSPITAL 22 - ER | Age: 44
Setting detail: OBSERVATION
LOS: 1 days | Discharge: HOME | End: 2024-07-27
Payer: SELF-PAY

## 2024-07-26 VITALS
RESPIRATION RATE: 18 BRPM | DIASTOLIC BLOOD PRESSURE: 87 MMHG | OXYGEN SATURATION: 100 % | SYSTOLIC BLOOD PRESSURE: 136 MMHG | TEMPERATURE: 97.88 F | HEART RATE: 89 BPM

## 2024-07-26 VITALS
RESPIRATION RATE: 18 BRPM | HEART RATE: 103 BPM | SYSTOLIC BLOOD PRESSURE: 140 MMHG | OXYGEN SATURATION: 100 % | DIASTOLIC BLOOD PRESSURE: 86 MMHG | TEMPERATURE: 98.06 F

## 2024-07-26 VITALS
TEMPERATURE: 98.24 F | RESPIRATION RATE: 18 BRPM | OXYGEN SATURATION: 100 % | HEART RATE: 92 BPM | DIASTOLIC BLOOD PRESSURE: 86 MMHG | SYSTOLIC BLOOD PRESSURE: 147 MMHG

## 2024-07-26 VITALS
TEMPERATURE: 98.06 F | SYSTOLIC BLOOD PRESSURE: 141 MMHG | OXYGEN SATURATION: 100 % | HEART RATE: 85 BPM | RESPIRATION RATE: 18 BRPM | DIASTOLIC BLOOD PRESSURE: 81 MMHG

## 2024-07-26 VITALS
HEART RATE: 80 BPM | OXYGEN SATURATION: 100 % | DIASTOLIC BLOOD PRESSURE: 86 MMHG | RESPIRATION RATE: 18 BRPM | TEMPERATURE: 98.1 F | SYSTOLIC BLOOD PRESSURE: 133 MMHG

## 2024-07-26 VITALS
HEART RATE: 85 BPM | TEMPERATURE: 98.42 F | SYSTOLIC BLOOD PRESSURE: 140 MMHG | OXYGEN SATURATION: 100 % | RESPIRATION RATE: 18 BRPM | DIASTOLIC BLOOD PRESSURE: 80 MMHG

## 2024-07-26 VITALS
RESPIRATION RATE: 18 BRPM | DIASTOLIC BLOOD PRESSURE: 85 MMHG | HEART RATE: 85 BPM | TEMPERATURE: 97.88 F | OXYGEN SATURATION: 100 % | SYSTOLIC BLOOD PRESSURE: 137 MMHG

## 2024-07-26 VITALS — HEART RATE: 99 BPM | DIASTOLIC BLOOD PRESSURE: 78 MMHG | OXYGEN SATURATION: 100 % | SYSTOLIC BLOOD PRESSURE: 130 MMHG

## 2024-07-26 VITALS
DIASTOLIC BLOOD PRESSURE: 74 MMHG | HEART RATE: 88 BPM | TEMPERATURE: 97.88 F | RESPIRATION RATE: 18 BRPM | OXYGEN SATURATION: 100 % | SYSTOLIC BLOOD PRESSURE: 120 MMHG

## 2024-07-26 VITALS
HEART RATE: 89 BPM | RESPIRATION RATE: 18 BRPM | OXYGEN SATURATION: 100 % | DIASTOLIC BLOOD PRESSURE: 75 MMHG | TEMPERATURE: 98.06 F | SYSTOLIC BLOOD PRESSURE: 128 MMHG

## 2024-07-26 VITALS — BODY MASS INDEX: 43.4 KG/M2 | BODY MASS INDEX: 32.8 KG/M2 | BODY MASS INDEX: 32.3 KG/M2

## 2024-07-26 VITALS — OXYGEN SATURATION: 98 % | DIASTOLIC BLOOD PRESSURE: 78 MMHG | SYSTOLIC BLOOD PRESSURE: 117 MMHG | HEART RATE: 95 BPM

## 2024-07-26 VITALS
OXYGEN SATURATION: 100 % | RESPIRATION RATE: 20 BRPM | DIASTOLIC BLOOD PRESSURE: 86 MMHG | TEMPERATURE: 98.06 F | HEART RATE: 78 BPM | SYSTOLIC BLOOD PRESSURE: 140 MMHG

## 2024-07-26 VITALS
SYSTOLIC BLOOD PRESSURE: 125 MMHG | TEMPERATURE: 98.2 F | RESPIRATION RATE: 18 BRPM | HEART RATE: 86 BPM | DIASTOLIC BLOOD PRESSURE: 72 MMHG | OXYGEN SATURATION: 100 %

## 2024-07-26 VITALS
TEMPERATURE: 97.88 F | SYSTOLIC BLOOD PRESSURE: 139 MMHG | HEART RATE: 85 BPM | OXYGEN SATURATION: 100 % | RESPIRATION RATE: 18 BRPM | DIASTOLIC BLOOD PRESSURE: 82 MMHG

## 2024-07-26 VITALS
DIASTOLIC BLOOD PRESSURE: 88 MMHG | RESPIRATION RATE: 18 BRPM | TEMPERATURE: 98.3 F | SYSTOLIC BLOOD PRESSURE: 139 MMHG | OXYGEN SATURATION: 100 % | HEART RATE: 87 BPM

## 2024-07-26 VITALS
SYSTOLIC BLOOD PRESSURE: 137 MMHG | HEART RATE: 91 BPM | TEMPERATURE: 97.8 F | DIASTOLIC BLOOD PRESSURE: 85 MMHG | RESPIRATION RATE: 18 BRPM | OXYGEN SATURATION: 99 %

## 2024-07-26 VITALS — OXYGEN SATURATION: 100 %

## 2024-07-26 VITALS
TEMPERATURE: 98.24 F | OXYGEN SATURATION: 100 % | HEART RATE: 82 BPM | RESPIRATION RATE: 18 BRPM | SYSTOLIC BLOOD PRESSURE: 140 MMHG | DIASTOLIC BLOOD PRESSURE: 89 MMHG

## 2024-07-26 VITALS
HEART RATE: 87 BPM | SYSTOLIC BLOOD PRESSURE: 140 MMHG | OXYGEN SATURATION: 100 % | RESPIRATION RATE: 18 BRPM | TEMPERATURE: 98.24 F | DIASTOLIC BLOOD PRESSURE: 84 MMHG

## 2024-07-26 VITALS
OXYGEN SATURATION: 100 % | SYSTOLIC BLOOD PRESSURE: 130 MMHG | HEART RATE: 100 BPM | RESPIRATION RATE: 20 BRPM | DIASTOLIC BLOOD PRESSURE: 87 MMHG | TEMPERATURE: 98.24 F

## 2024-07-26 VITALS
SYSTOLIC BLOOD PRESSURE: 133 MMHG | DIASTOLIC BLOOD PRESSURE: 79 MMHG | OXYGEN SATURATION: 100 % | RESPIRATION RATE: 18 BRPM | HEART RATE: 80 BPM | TEMPERATURE: 97.8 F

## 2024-07-26 VITALS
DIASTOLIC BLOOD PRESSURE: 87 MMHG | OXYGEN SATURATION: 100 % | SYSTOLIC BLOOD PRESSURE: 144 MMHG | TEMPERATURE: 97.8 F | RESPIRATION RATE: 18 BRPM | HEART RATE: 87 BPM

## 2024-07-26 VITALS
HEART RATE: 88 BPM | DIASTOLIC BLOOD PRESSURE: 82 MMHG | SYSTOLIC BLOOD PRESSURE: 142 MMHG | OXYGEN SATURATION: 99 % | TEMPERATURE: 98.06 F | RESPIRATION RATE: 18 BRPM

## 2024-07-26 VITALS — HEART RATE: 88 BPM | OXYGEN SATURATION: 100 % | DIASTOLIC BLOOD PRESSURE: 86 MMHG | SYSTOLIC BLOOD PRESSURE: 147 MMHG

## 2024-07-26 VITALS — HEART RATE: 90 BPM

## 2024-07-26 VITALS
TEMPERATURE: 98.24 F | RESPIRATION RATE: 20 BRPM | SYSTOLIC BLOOD PRESSURE: 143 MMHG | DIASTOLIC BLOOD PRESSURE: 71 MMHG | OXYGEN SATURATION: 100 % | HEART RATE: 80 BPM

## 2024-07-26 DIAGNOSIS — R51.9: ICD-10-CM

## 2024-07-26 DIAGNOSIS — K92.2: Primary | ICD-10-CM

## 2024-07-26 DIAGNOSIS — D64.9: Primary | ICD-10-CM

## 2024-07-26 DIAGNOSIS — Z79.899: ICD-10-CM

## 2024-07-26 DIAGNOSIS — E66.1: ICD-10-CM

## 2024-07-26 DIAGNOSIS — K64.9: ICD-10-CM

## 2024-07-26 DIAGNOSIS — R07.89: ICD-10-CM

## 2024-07-26 DIAGNOSIS — D50.9: ICD-10-CM

## 2024-07-26 LAB
ALBUMIN LEVEL: 4.3 G/DL (ref 3.5–5)
ALBUMIN LEVEL: 4.4 G/DL (ref 3.5–5)
ALBUMIN/GLOB SERPL: 1.7 {RATIO} (ref 1.1–1.8)
ALBUMIN/GLOB SERPL: 1.9 {RATIO} (ref 1.1–1.8)
ALP ISO SERPL-ACNC: 40 U/L (ref 38–126)
ALP ISO SERPL-ACNC: 44 U/L (ref 38–126)
ALT SERPLBLD-CCNC: 21 U/L (ref 12–78)
ALT SERPLBLD-CCNC: 23 U/L (ref 12–78)
ANION GAP SERPL CALC-SCNC: 11.4 MEQ/L (ref 5–15)
ANION GAP SERPL CALC-SCNC: 9.6 MEQ/L (ref 5–15)
APTT PPP: 23.6 SECONDS (ref 22.8–30.6)
AST SERPL QL: 26 U/L (ref 17–59)
AST SERPL QL: 28 U/L (ref 17–59)
BILIRUBIN,TOTAL: 0.4 MG/DL (ref 0.2–1.3)
BILIRUBIN,TOTAL: 0.6 MG/DL (ref 0.2–1.3)
BUN SERPL-MCNC: 13 MG/DL (ref 9–20)
BUN SERPL-MCNC: 15 MG/DL (ref 9–20)
CALCIUM SPEC-MCNC: 8.9 MG/DL (ref 8.4–10.2)
CALCIUM SPEC-MCNC: 8.9 MG/DL (ref 8.4–10.2)
CHLORIDE SPEC-SCNC: 108 MMOL/L (ref 98–107)
CHLORIDE SPEC-SCNC: 108 MMOL/L (ref 98–107)
CHOLEST SPEC-SCNC: 128 MG/DL (ref 140–200)
CO2 SERPL-SCNC: 24 MMOL/L (ref 22–30)
CO2 SERPL-SCNC: 25 MMOL/L (ref 22–30)
CREAT BLD-SCNC: 0.7 MG/DL (ref 0.66–1.25)
CREAT BLD-SCNC: 0.8 MG/DL (ref 0.66–1.25)
CREATININE CLEARANCE ESTIMATED: 88 ML/MIN (ref 50–200)
CRP SERPL HS-MCNC: 0.7 MG/L (ref 0–4)
ESTIMATED GLOMERULAR FILT RATE: 106 ML/MIN (ref 60–?)
ESTIMATED GLOMERULAR FILT RATE: 123 ML/MIN (ref 60–?)
FERRITIN SERPL-MCNC: 3.61 NG/ML (ref 17.9–464)
GFR (AFRICAN AMERICAN): 128 ML/MIN (ref 60–?)
GFR (AFRICAN AMERICAN): 149 ML/MIN (ref 60–?)
GLOBULIN SER CALC-MCNC: 2.3 G/DL (ref 1.3–3.2)
GLOBULIN SER CALC-MCNC: 2.5 G/DL (ref 1.3–3.2)
GLUCOSE: 120 MG/DL (ref 74–100)
GLUCOSE: 95 MG/DL (ref 74–100)
HCT VFR BLD CALC: 19.1 % (ref 42–52)
HCT VFR BLD CALC: 20 % (ref 42–52)
HDLC SERPL-MCNC: 34 MG/DL (ref 40–60)
HGB BLD-MCNC: 5.6 G/DL (ref 14.1–18)
HGB BLD-MCNC: 5.6 G/DL (ref 14.1–18)
INR PPP: 1 (ref 0.9–1.1)
IRON SERPL QL: 31 UG/DL (ref 49–181)
MAGNESIUM: 2.2 MG/DL (ref 1.6–2.3)
MCHC RBC-ENTMCNC: 27.8 G/DL (ref 31.8–35.4)
MCHC RBC-ENTMCNC: 29.5 G/DL (ref 31.8–35.4)
MCV RBC: 59.4 FL (ref 80–94)
MCV RBC: 60.2 FL (ref 80–94)
MEAN CORPUSCULAR HEMOGLOBIN: 16.5 PG (ref 27–31.2)
MEAN CORPUSCULAR HEMOGLOBIN: 17.8 PG (ref 27–31.2)
PLATELET # BLD: 238 K/MM3 (ref 142–424)
PLATELET # BLD: 252 K/MM3 (ref 142–424)
POTASSIUM: 3.6 MMOL/L (ref 3.5–5.1)
POTASSIUM: 4.4 MMOL/L (ref 3.5–5.1)
PROT SERPL-MCNC: 6.7 G/DL (ref 6.3–8.2)
PROT SERPL-MCNC: 6.8 G/DL (ref 6.3–8.2)
PT BLD: 11.2 SECONDS (ref 10.1–12.5)
RBC # BLD AUTO: 3.17 M/MM3 (ref 4.6–6.2)
RBC # BLD AUTO: 3.37 M/MM3 (ref 4.6–6.2)
RETICS/RBC NFR AUTO: 3.6 % (ref 0.9–3.2)
SODIUM SPEC-SCNC: 139 MMOL/L (ref 136–145)
SODIUM SPEC-SCNC: 139 MMOL/L (ref 136–145)
TOTAL IRON BINDING CAPACITY: 481 UG/DL (ref 261–462)
TRIGLYCERIDES: 135 MG/DL (ref 30–150)
TSH SERPL-ACNC: 1.02 UIU/ML (ref 0.47–4.68)
VITAMIN B12: 799 PG/ML (ref 239–931)
WBC # BLD AUTO: 3.1 K/MM3 (ref 4.8–10.8)
WBC # BLD AUTO: 3.1 K/MM3 (ref 4.8–10.8)

## 2024-07-26 PROCEDURE — 80053 COMPREHEN METABOLIC PANEL: CPT

## 2024-07-26 PROCEDURE — G0378 HOSPITAL OBSERVATION PER HR: HCPCS

## 2024-07-26 PROCEDURE — 86850 RBC ANTIBODY SCREEN: CPT

## 2024-07-26 PROCEDURE — 85018 HEMOGLOBIN: CPT

## 2024-07-26 PROCEDURE — 85025 COMPLETE CBC W/AUTO DIFF WBC: CPT

## 2024-07-26 PROCEDURE — 82607 VITAMIN B-12: CPT

## 2024-07-26 PROCEDURE — 85730 THROMBOPLASTIN TIME PARTIAL: CPT

## 2024-07-26 PROCEDURE — 99285 EMERGENCY DEPT VISIT HI MDM: CPT

## 2024-07-26 PROCEDURE — 86140 C-REACTIVE PROTEIN: CPT

## 2024-07-26 PROCEDURE — 85610 PROTHROMBIN TIME: CPT

## 2024-07-26 PROCEDURE — 80050 GENERAL HEALTH PANEL: CPT

## 2024-07-26 PROCEDURE — 80061 LIPID PANEL: CPT

## 2024-07-26 PROCEDURE — 83735 ASSAY OF MAGNESIUM: CPT

## 2024-07-26 PROCEDURE — 36430 TRANSFUSION BLD/BLD COMPNT: CPT

## 2024-07-26 PROCEDURE — 85044 MANUAL RETICULOCYTE COUNT: CPT

## 2024-07-26 PROCEDURE — 83615 LACTATE (LD) (LDH) ENZYME: CPT

## 2024-07-26 PROCEDURE — 83550 IRON BINDING TEST: CPT

## 2024-07-26 PROCEDURE — 83540 ASSAY OF IRON: CPT

## 2024-07-26 PROCEDURE — P9016 RBC LEUKOCYTES REDUCED: HCPCS

## 2024-07-26 PROCEDURE — 84443 ASSAY THYROID STIM HORMONE: CPT

## 2024-07-26 PROCEDURE — 85014 HEMATOCRIT: CPT

## 2024-07-26 PROCEDURE — 36415 COLL VENOUS BLD VENIPUNCTURE: CPT

## 2024-07-26 PROCEDURE — 82728 ASSAY OF FERRITIN: CPT

## 2024-07-26 PROCEDURE — 83010 ASSAY OF HAPTOGLOBIN QUANT: CPT

## 2024-07-26 RX ADMIN — PANTOPRAZOLE SODIUM 40 MG: 40 INJECTION, POWDER, FOR SOLUTION INTRAVENOUS at 20:21

## 2024-07-26 RX ADMIN — SODIUM CHLORIDE 10 ML: 9 INJECTION, SOLUTION INTRAMUSCULAR; INTRAVENOUS; SUBCUTANEOUS at 20:21

## 2024-07-27 VITALS
TEMPERATURE: 98.1 F | SYSTOLIC BLOOD PRESSURE: 143 MMHG | OXYGEN SATURATION: 100 % | HEART RATE: 70 BPM | RESPIRATION RATE: 20 BRPM | DIASTOLIC BLOOD PRESSURE: 78 MMHG

## 2024-07-27 VITALS
TEMPERATURE: 98.06 F | DIASTOLIC BLOOD PRESSURE: 91 MMHG | HEART RATE: 76 BPM | RESPIRATION RATE: 18 BRPM | OXYGEN SATURATION: 100 % | SYSTOLIC BLOOD PRESSURE: 148 MMHG

## 2024-07-27 VITALS
DIASTOLIC BLOOD PRESSURE: 77 MMHG | OXYGEN SATURATION: 100 % | HEART RATE: 78 BPM | RESPIRATION RATE: 17 BRPM | SYSTOLIC BLOOD PRESSURE: 135 MMHG | TEMPERATURE: 98.06 F

## 2024-07-27 VITALS
SYSTOLIC BLOOD PRESSURE: 117 MMHG | RESPIRATION RATE: 16 BRPM | TEMPERATURE: 97.88 F | HEART RATE: 70 BPM | DIASTOLIC BLOOD PRESSURE: 71 MMHG | OXYGEN SATURATION: 100 %

## 2024-07-27 VITALS
HEART RATE: 71 BPM | OXYGEN SATURATION: 100 % | DIASTOLIC BLOOD PRESSURE: 71 MMHG | TEMPERATURE: 97.9 F | RESPIRATION RATE: 20 BRPM | SYSTOLIC BLOOD PRESSURE: 128 MMHG

## 2024-07-27 VITALS — HEART RATE: 70 BPM

## 2024-07-27 VITALS
OXYGEN SATURATION: 100 % | TEMPERATURE: 97.88 F | RESPIRATION RATE: 17 BRPM | DIASTOLIC BLOOD PRESSURE: 90 MMHG | SYSTOLIC BLOOD PRESSURE: 137 MMHG | HEART RATE: 90 BPM

## 2024-07-27 LAB
ALBUMIN LEVEL: 3.9 G/DL (ref 3.5–5)
ALBUMIN/GLOB SERPL: 1.7 {RATIO} (ref 1.1–1.8)
ALP ISO SERPL-ACNC: 41 U/L (ref 38–126)
ALT SERPLBLD-CCNC: 21 U/L (ref 12–78)
ANION GAP SERPL CALC-SCNC: 9 MEQ/L (ref 5–15)
AST SERPL QL: 25 U/L (ref 17–59)
BILIRUBIN,TOTAL: 0.7 MG/DL (ref 0.2–1.3)
BUN SERPL-MCNC: 10 MG/DL (ref 9–20)
CALCIUM SPEC-MCNC: 8.5 MG/DL (ref 8.4–10.2)
CHLORIDE SPEC-SCNC: 111 MMOL/L (ref 98–107)
CO2 SERPL-SCNC: 24 MMOL/L (ref 22–30)
CREAT BLD-SCNC: 0.9 MG/DL (ref 0.66–1.25)
CREATININE CLEARANCE ESTIMATED: 159 ML/MIN (ref 50–200)
ESTIMATED GLOMERULAR FILT RATE: 92 ML/MIN (ref 60–?)
GFR (AFRICAN AMERICAN): 111 ML/MIN (ref 60–?)
GLOBULIN SER CALC-MCNC: 2.3 G/DL (ref 1.3–3.2)
GLUCOSE: 91 MG/DL (ref 74–100)
HCT VFR BLD CALC: 23.4 % (ref 42–52)
HCT VFR BLD CALC: 24.1 % (ref 42–52)
HCT VFR BLD CALC: 24.5 % (ref 42–52)
HGB BLD-MCNC: 7 G/DL (ref 14.1–18)
HGB BLD-MCNC: 7.3 G/DL (ref 14.1–18)
HGB BLD-MCNC: 7.3 G/DL (ref 14.1–18)
MAGNESIUM: 2.3 MG/DL (ref 1.6–2.3)
MCHC RBC-ENTMCNC: 29.9 G/DL (ref 31.8–35.4)
MCV RBC: 66.1 FL (ref 80–94)
MEAN CORPUSCULAR HEMOGLOBIN: 19.8 PG (ref 27–31.2)
PLATELET # BLD: 240 K/MM3 (ref 142–424)
POTASSIUM: 4 MMOL/L (ref 3.5–5.1)
PROT SERPL-MCNC: 6.2 G/DL (ref 6.3–8.2)
RBC # BLD AUTO: 3.7 M/MM3 (ref 4.6–6.2)
SODIUM SPEC-SCNC: 140 MMOL/L (ref 136–145)
WBC # BLD AUTO: 3.6 K/MM3 (ref 4.8–10.8)

## 2024-07-27 RX ADMIN — IRON SUCROSE 220 MG: 20 INJECTION, SOLUTION INTRAVENOUS at 10:30

## 2024-07-29 ENCOUNTER — HOSPITAL ENCOUNTER (OUTPATIENT)
Age: 44
Discharge: HOME | End: 2024-07-29
Payer: SELF-PAY

## 2024-07-29 VITALS
RESPIRATION RATE: 18 BRPM | DIASTOLIC BLOOD PRESSURE: 70 MMHG | SYSTOLIC BLOOD PRESSURE: 124 MMHG | OXYGEN SATURATION: 96 % | HEART RATE: 92 BPM

## 2024-07-29 VITALS — BODY MASS INDEX: 32.1 KG/M2

## 2024-07-29 VITALS
RESPIRATION RATE: 18 BRPM | OXYGEN SATURATION: 100 % | SYSTOLIC BLOOD PRESSURE: 141 MMHG | HEART RATE: 89 BPM | DIASTOLIC BLOOD PRESSURE: 84 MMHG

## 2024-07-29 VITALS
SYSTOLIC BLOOD PRESSURE: 138 MMHG | RESPIRATION RATE: 18 BRPM | OXYGEN SATURATION: 100 % | DIASTOLIC BLOOD PRESSURE: 89 MMHG | HEART RATE: 91 BPM | TEMPERATURE: 97.88 F

## 2024-07-29 VITALS
RESPIRATION RATE: 15 BRPM | OXYGEN SATURATION: 95 % | SYSTOLIC BLOOD PRESSURE: 138 MMHG | TEMPERATURE: 98.2 F | DIASTOLIC BLOOD PRESSURE: 75 MMHG | HEART RATE: 93 BPM

## 2024-07-29 VITALS
TEMPERATURE: 97.88 F | DIASTOLIC BLOOD PRESSURE: 88 MMHG | RESPIRATION RATE: 18 BRPM | HEART RATE: 88 BPM | OXYGEN SATURATION: 97 % | SYSTOLIC BLOOD PRESSURE: 133 MMHG

## 2024-07-29 VITALS
OXYGEN SATURATION: 91 % | HEART RATE: 99 BPM | DIASTOLIC BLOOD PRESSURE: 63 MMHG | TEMPERATURE: 97.52 F | RESPIRATION RATE: 16 BRPM | SYSTOLIC BLOOD PRESSURE: 114 MMHG

## 2024-07-29 VITALS
OXYGEN SATURATION: 93 % | DIASTOLIC BLOOD PRESSURE: 77 MMHG | HEART RATE: 93 BPM | RESPIRATION RATE: 15 BRPM | SYSTOLIC BLOOD PRESSURE: 118 MMHG

## 2024-07-29 VITALS
SYSTOLIC BLOOD PRESSURE: 122 MMHG | OXYGEN SATURATION: 98 % | DIASTOLIC BLOOD PRESSURE: 71 MMHG | HEART RATE: 98 BPM | RESPIRATION RATE: 17 BRPM

## 2024-07-29 VITALS
HEART RATE: 88 BPM | OXYGEN SATURATION: 100 % | DIASTOLIC BLOOD PRESSURE: 85 MMHG | SYSTOLIC BLOOD PRESSURE: 138 MMHG | RESPIRATION RATE: 18 BRPM

## 2024-07-29 VITALS
DIASTOLIC BLOOD PRESSURE: 70 MMHG | SYSTOLIC BLOOD PRESSURE: 142 MMHG | OXYGEN SATURATION: 96 % | HEART RATE: 90 BPM | RESPIRATION RATE: 17 BRPM

## 2024-07-29 VITALS
SYSTOLIC BLOOD PRESSURE: 134 MMHG | DIASTOLIC BLOOD PRESSURE: 76 MMHG | OXYGEN SATURATION: 100 % | HEART RATE: 93 BPM | RESPIRATION RATE: 16 BRPM

## 2024-07-29 VITALS
DIASTOLIC BLOOD PRESSURE: 63 MMHG | RESPIRATION RATE: 14 BRPM | HEART RATE: 100 BPM | SYSTOLIC BLOOD PRESSURE: 114 MMHG | TEMPERATURE: 97.6 F | OXYGEN SATURATION: 90 %

## 2024-07-29 DIAGNOSIS — R06.09: ICD-10-CM

## 2024-07-29 DIAGNOSIS — K64.8: Primary | ICD-10-CM

## 2024-07-29 DIAGNOSIS — R53.83: ICD-10-CM

## 2024-07-29 DIAGNOSIS — D64.9: ICD-10-CM

## 2024-07-29 DIAGNOSIS — I10: ICD-10-CM

## 2024-07-29 DIAGNOSIS — K57.90: ICD-10-CM

## 2024-07-29 DIAGNOSIS — R00.2: ICD-10-CM

## 2024-07-29 DIAGNOSIS — G43.909: ICD-10-CM

## 2024-07-29 LAB
ANION GAP SERPL CALC-SCNC: 12 MEQ/L (ref 5–15)
BUN SERPL-MCNC: 10 MG/DL (ref 9–20)
CALCIUM SPEC-MCNC: 9 MG/DL (ref 8.4–10.2)
CHLORIDE SPEC-SCNC: 109 MMOL/L (ref 98–107)
CO2 SERPL-SCNC: 23 MMOL/L (ref 22–30)
CREAT BLD-SCNC: 0.7 MG/DL (ref 0.66–1.25)
CREATININE CLEARANCE ESTIMATED: 201 ML/MIN (ref 50–200)
ESTIMATED GLOMERULAR FILT RATE: 123 ML/MIN (ref 60–?)
GFR (AFRICAN AMERICAN): 149 ML/MIN (ref 60–?)
GLUCOSE: 99 MG/DL (ref 74–100)
HCT VFR BLD CALC: 28.5 % (ref 42–52)
HGB BLD-MCNC: 8.5 G/DL (ref 14.1–18)
MCHC RBC-ENTMCNC: 29.9 G/DL (ref 31.8–35.4)
MCV RBC: 66.8 FL (ref 80–94)
MEAN CORPUSCULAR HEMOGLOBIN: 20 PG (ref 27–31.2)
PLATELET # BLD: 232 K/MM3 (ref 142–424)
POTASSIUM: 4 MMOL/L (ref 3.5–5.1)
RBC # BLD AUTO: 4.26 M/MM3 (ref 4.6–6.2)
SODIUM SPEC-SCNC: 140 MMOL/L (ref 136–145)
WBC # BLD AUTO: 3.5 K/MM3 (ref 4.8–10.8)

## 2024-07-29 PROCEDURE — 46260 REMOVE IN/EX HEM GROUPS 2+: CPT

## 2024-07-29 PROCEDURE — 80048 BASIC METABOLIC PNL TOTAL CA: CPT

## 2024-07-29 PROCEDURE — 96374 THER/PROPH/DIAG INJ IV PUSH: CPT

## 2024-07-29 PROCEDURE — 85025 COMPLETE CBC W/AUTO DIFF WBC: CPT

## 2024-07-30 VITALS
OXYGEN SATURATION: 95 % | RESPIRATION RATE: 15 BRPM | HEART RATE: 93 BPM | DIASTOLIC BLOOD PRESSURE: 75 MMHG | SYSTOLIC BLOOD PRESSURE: 138 MMHG

## 2024-08-16 ENCOUNTER — HOSPITAL ENCOUNTER (OUTPATIENT)
Dept: HOSPITAL 22 - LAB | Age: 44
Discharge: HOME | End: 2024-08-16
Payer: SELF-PAY

## 2024-08-16 DIAGNOSIS — D64.9: Primary | ICD-10-CM

## 2024-08-16 LAB
HCT VFR BLD CALC: 29.9 % (ref 42–52)
HGB BLD-MCNC: 8.6 G/DL (ref 14.1–18)

## 2024-08-16 PROCEDURE — 85018 HEMOGLOBIN: CPT

## 2024-08-16 PROCEDURE — 36415 COLL VENOUS BLD VENIPUNCTURE: CPT

## 2024-08-16 PROCEDURE — 85014 HEMATOCRIT: CPT

## 2024-12-09 ENCOUNTER — HOSPITAL ENCOUNTER (EMERGENCY)
Age: 44
Discharge: HOME | End: 2024-12-09
Payer: COMMERCIAL

## 2024-12-09 VITALS
RESPIRATION RATE: 20 BRPM | TEMPERATURE: 98.3 F | SYSTOLIC BLOOD PRESSURE: 161 MMHG | OXYGEN SATURATION: 98 % | DIASTOLIC BLOOD PRESSURE: 102 MMHG | HEART RATE: 94 BPM

## 2024-12-09 VITALS
RESPIRATION RATE: 16 BRPM | TEMPERATURE: 98.3 F | SYSTOLIC BLOOD PRESSURE: 136 MMHG | OXYGEN SATURATION: 97 % | HEART RATE: 82 BPM | DIASTOLIC BLOOD PRESSURE: 76 MMHG

## 2024-12-09 VITALS — BODY MASS INDEX: 32.1 KG/M2

## 2024-12-09 DIAGNOSIS — K61.2: Primary | ICD-10-CM

## 2024-12-09 DIAGNOSIS — K61.0: ICD-10-CM

## 2024-12-09 PROCEDURE — 99283 EMERGENCY DEPT VISIT LOW MDM: CPT

## 2024-12-19 ENCOUNTER — HOSPITAL ENCOUNTER (OUTPATIENT)
Age: 44
Discharge: HOME | End: 2024-12-19
Payer: COMMERCIAL

## 2024-12-19 VITALS
RESPIRATION RATE: 18 BRPM | SYSTOLIC BLOOD PRESSURE: 117 MMHG | TEMPERATURE: 97.52 F | HEART RATE: 78 BPM | DIASTOLIC BLOOD PRESSURE: 73 MMHG | OXYGEN SATURATION: 95 %

## 2024-12-19 VITALS
HEART RATE: 76 BPM | TEMPERATURE: 97.52 F | DIASTOLIC BLOOD PRESSURE: 73 MMHG | SYSTOLIC BLOOD PRESSURE: 115 MMHG | OXYGEN SATURATION: 94 % | RESPIRATION RATE: 18 BRPM

## 2024-12-19 VITALS
DIASTOLIC BLOOD PRESSURE: 89 MMHG | OXYGEN SATURATION: 98 % | HEART RATE: 78 BPM | TEMPERATURE: 97.5 F | SYSTOLIC BLOOD PRESSURE: 128 MMHG | RESPIRATION RATE: 16 BRPM

## 2024-12-19 VITALS
DIASTOLIC BLOOD PRESSURE: 105 MMHG | SYSTOLIC BLOOD PRESSURE: 161 MMHG | TEMPERATURE: 97.5 F | HEART RATE: 86 BPM | OXYGEN SATURATION: 99 % | RESPIRATION RATE: 18 BRPM

## 2024-12-19 VITALS
HEART RATE: 85 BPM | OXYGEN SATURATION: 98 % | DIASTOLIC BLOOD PRESSURE: 111 MMHG | SYSTOLIC BLOOD PRESSURE: 154 MMHG | RESPIRATION RATE: 18 BRPM

## 2024-12-19 VITALS
TEMPERATURE: 97.88 F | HEART RATE: 75 BPM | RESPIRATION RATE: 15 BRPM | DIASTOLIC BLOOD PRESSURE: 82 MMHG | OXYGEN SATURATION: 99 % | SYSTOLIC BLOOD PRESSURE: 129 MMHG

## 2024-12-19 VITALS
HEART RATE: 82 BPM | DIASTOLIC BLOOD PRESSURE: 105 MMHG | RESPIRATION RATE: 18 BRPM | OXYGEN SATURATION: 96 % | SYSTOLIC BLOOD PRESSURE: 161 MMHG | TEMPERATURE: 97.5 F

## 2024-12-19 VITALS
OXYGEN SATURATION: 99 % | DIASTOLIC BLOOD PRESSURE: 79 MMHG | RESPIRATION RATE: 16 BRPM | HEART RATE: 78 BPM | SYSTOLIC BLOOD PRESSURE: 134 MMHG

## 2024-12-19 VITALS
OXYGEN SATURATION: 98 % | HEART RATE: 85 BPM | RESPIRATION RATE: 18 BRPM | SYSTOLIC BLOOD PRESSURE: 154 MMHG | DIASTOLIC BLOOD PRESSURE: 111 MMHG | TEMPERATURE: 97.52 F

## 2024-12-19 VITALS
DIASTOLIC BLOOD PRESSURE: 97 MMHG | RESPIRATION RATE: 18 BRPM | SYSTOLIC BLOOD PRESSURE: 156 MMHG | TEMPERATURE: 97.88 F | HEART RATE: 76 BPM | OXYGEN SATURATION: 98 %

## 2024-12-19 VITALS
DIASTOLIC BLOOD PRESSURE: 73 MMHG | RESPIRATION RATE: 16 BRPM | SYSTOLIC BLOOD PRESSURE: 115 MMHG | HEART RATE: 80 BPM | OXYGEN SATURATION: 93 % | TEMPERATURE: 97.88 F

## 2024-12-19 VITALS
RESPIRATION RATE: 18 BRPM | HEART RATE: 93 BPM | OXYGEN SATURATION: 95 % | DIASTOLIC BLOOD PRESSURE: 105 MMHG | TEMPERATURE: 97.52 F | SYSTOLIC BLOOD PRESSURE: 167 MMHG

## 2024-12-19 VITALS — BODY MASS INDEX: 32.1 KG/M2

## 2024-12-19 DIAGNOSIS — K61.2: Primary | ICD-10-CM

## 2024-12-19 DIAGNOSIS — K61.0: ICD-10-CM

## 2024-12-19 LAB
ANION GAP SERPL CALC-SCNC: 7.1 MEQ/L (ref 5–15)
BUN SERPL-MCNC: 11 MG/DL (ref 9–20)
CALCIUM SPEC-MCNC: 9 MG/DL (ref 8.4–10.2)
CHLORIDE SPEC-SCNC: 108 MMOL/L (ref 98–107)
CO2 SERPL-SCNC: 27 MMOL/L (ref 22–30)
CREAT BLD-SCNC: 0.8 MG/DL (ref 0.66–1.25)
CREATININE CLEARANCE ESTIMATED: 176 ML/MIN (ref 50–200)
ESTIMATED GLOMERULAR FILT RATE: 106 ML/MIN (ref 60–?)
GFR (AFRICAN AMERICAN): 128 ML/MIN (ref 60–?)
GLUCOSE: 91 MG/DL (ref 74–100)
HCT VFR BLD CALC: 47.8 % (ref 42–52)
HGB BLD-MCNC: 14.8 G/DL (ref 14.1–18)
MCHC RBC-ENTMCNC: 31 G/DL (ref 31.8–35.4)
MCV RBC: 73.1 FL (ref 80–94)
MEAN CORPUSCULAR HEMOGLOBIN: 22.6 PG (ref 27–31.2)
PLATELET # BLD: 194 K/MM3 (ref 142–424)
POTASSIUM: 5.1 MMOL/L (ref 3.5–5.1)
RBC # BLD AUTO: 6.54 M/MM3 (ref 4.6–6.2)
SODIUM SPEC-SCNC: 137 MMOL/L (ref 136–145)
WBC # BLD AUTO: 5.1 K/MM3 (ref 4.8–10.8)

## 2024-12-19 PROCEDURE — 96374 THER/PROPH/DIAG INJ IV PUSH: CPT

## 2024-12-19 PROCEDURE — 80048 BASIC METABOLIC PNL TOTAL CA: CPT

## 2024-12-19 PROCEDURE — 46020 PLACEMENT OF SETON: CPT

## 2024-12-19 PROCEDURE — 85025 COMPLETE CBC W/AUTO DIFF WBC: CPT

## 2025-01-01 ENCOUNTER — PATIENT ROUNDING (BHMG ONLY) (OUTPATIENT)
Dept: URGENT CARE | Facility: CLINIC | Age: 45
End: 2025-01-01
Payer: COMMERCIAL

## 2025-01-08 ENCOUNTER — HOSPITAL ENCOUNTER (OUTPATIENT)
Dept: HOSPITAL 22 - RAD | Age: 45
Discharge: HOME | End: 2025-01-08
Payer: COMMERCIAL

## 2025-01-08 DIAGNOSIS — M54.12: Primary | ICD-10-CM

## 2025-01-08 PROCEDURE — 72040 X-RAY EXAM NECK SPINE 2-3 VW: CPT
